# Patient Record
Sex: FEMALE | Race: WHITE | Employment: FULL TIME | URBAN - METROPOLITAN AREA
[De-identification: names, ages, dates, MRNs, and addresses within clinical notes are randomized per-mention and may not be internally consistent; named-entity substitution may affect disease eponyms.]

---

## 2017-06-14 DIAGNOSIS — Z13.6 CARDIOVASCULAR SCREENING; LDL GOAL LESS THAN 160: ICD-10-CM

## 2017-06-14 DIAGNOSIS — Z12.31 VISIT FOR SCREENING MAMMOGRAM: ICD-10-CM

## 2017-06-14 DIAGNOSIS — Z13.1 SCREENING FOR DIABETES MELLITUS (DM): ICD-10-CM

## 2017-06-14 DIAGNOSIS — Z13.29 SCREENING FOR THYROID DISORDER: ICD-10-CM

## 2017-06-14 DIAGNOSIS — Z00.00 ROUTINE GENERAL MEDICAL EXAMINATION AT A HEALTH CARE FACILITY: Primary | ICD-10-CM

## 2017-06-14 DIAGNOSIS — Z13.0 SCREENING FOR DEFICIENCY ANEMIA: ICD-10-CM

## 2017-06-28 ENCOUNTER — RADIANT APPOINTMENT (OUTPATIENT)
Dept: MAMMOGRAPHY | Facility: CLINIC | Age: 52
End: 2017-06-28
Payer: COMMERCIAL

## 2017-06-28 ENCOUNTER — OFFICE VISIT (OUTPATIENT)
Dept: PEDIATRICS | Facility: CLINIC | Age: 52
End: 2017-06-28
Payer: COMMERCIAL

## 2017-06-28 VITALS
BODY MASS INDEX: 28.01 KG/M2 | DIASTOLIC BLOOD PRESSURE: 78 MMHG | WEIGHT: 168.1 LBS | OXYGEN SATURATION: 100 % | TEMPERATURE: 97.5 F | HEART RATE: 75 BPM | HEIGHT: 65 IN | SYSTOLIC BLOOD PRESSURE: 126 MMHG

## 2017-06-28 DIAGNOSIS — Z13.29 SCREENING FOR THYROID DISORDER: ICD-10-CM

## 2017-06-28 DIAGNOSIS — R87.610 ASCUS WITH POSITIVE HIGH RISK HPV CERVICAL: ICD-10-CM

## 2017-06-28 DIAGNOSIS — Z00.00 ROUTINE GENERAL MEDICAL EXAMINATION AT A HEALTH CARE FACILITY: ICD-10-CM

## 2017-06-28 DIAGNOSIS — Z13.0 SCREENING FOR DEFICIENCY ANEMIA: ICD-10-CM

## 2017-06-28 DIAGNOSIS — Z80.3 FAMILY HISTORY OF BREAST CANCER: ICD-10-CM

## 2017-06-28 DIAGNOSIS — Z12.11 SCREENING FOR COLON CANCER: ICD-10-CM

## 2017-06-28 DIAGNOSIS — R87.810 ASCUS WITH POSITIVE HIGH RISK HPV CERVICAL: ICD-10-CM

## 2017-06-28 DIAGNOSIS — Z13.6 CARDIOVASCULAR SCREENING; LDL GOAL LESS THAN 160: ICD-10-CM

## 2017-06-28 DIAGNOSIS — Z12.4 SCREENING FOR CERVICAL CANCER: ICD-10-CM

## 2017-06-28 DIAGNOSIS — Z13.1 SCREENING FOR DIABETES MELLITUS (DM): ICD-10-CM

## 2017-06-28 DIAGNOSIS — Z00.00 ROUTINE GENERAL MEDICAL EXAMINATION AT A HEALTH CARE FACILITY: Primary | ICD-10-CM

## 2017-06-28 DIAGNOSIS — R87.610 PAPANICOLAOU SMEAR OF CERVIX WITH ATYPICAL SQUAMOUS CELLS OF UNDETERMINED SIGNIFICANCE (ASC-US): ICD-10-CM

## 2017-06-28 DIAGNOSIS — Z12.31 VISIT FOR SCREENING MAMMOGRAM: ICD-10-CM

## 2017-06-28 LAB
ALBUMIN SERPL-MCNC: 3.8 G/DL (ref 3.4–5)
ALP SERPL-CCNC: 60 U/L (ref 40–150)
ALT SERPL W P-5'-P-CCNC: 27 U/L (ref 0–50)
ANION GAP SERPL CALCULATED.3IONS-SCNC: 6 MMOL/L (ref 3–14)
AST SERPL W P-5'-P-CCNC: 22 U/L (ref 0–45)
BASOPHILS # BLD AUTO: 0 10E9/L (ref 0–0.2)
BASOPHILS NFR BLD AUTO: 0.4 %
BILIRUB SERPL-MCNC: 0.7 MG/DL (ref 0.2–1.3)
BUN SERPL-MCNC: 8 MG/DL (ref 7–30)
CALCIUM SERPL-MCNC: 8.9 MG/DL (ref 8.5–10.1)
CHLORIDE SERPL-SCNC: 104 MMOL/L (ref 94–109)
CHOLEST SERPL-MCNC: 161 MG/DL
CO2 SERPL-SCNC: 28 MMOL/L (ref 20–32)
CREAT SERPL-MCNC: 0.79 MG/DL (ref 0.52–1.04)
DIFFERENTIAL METHOD BLD: NORMAL
EOSINOPHIL # BLD AUTO: 0.1 10E9/L (ref 0–0.7)
EOSINOPHIL NFR BLD AUTO: 0.7 %
ERYTHROCYTE [DISTWIDTH] IN BLOOD BY AUTOMATED COUNT: 13.1 % (ref 10–15)
GFR SERPL CREATININE-BSD FRML MDRD: 77 ML/MIN/1.7M2
GLUCOSE SERPL-MCNC: 95 MG/DL (ref 70–99)
HCT VFR BLD AUTO: 39 % (ref 35–47)
HDLC SERPL-MCNC: 76 MG/DL
HGB BLD-MCNC: 13.2 G/DL (ref 11.7–15.7)
LDLC SERPL CALC-MCNC: 70 MG/DL
LYMPHOCYTES # BLD AUTO: 1.2 10E9/L (ref 0.8–5.3)
LYMPHOCYTES NFR BLD AUTO: 17.4 %
MCH RBC QN AUTO: 31.3 PG (ref 26.5–33)
MCHC RBC AUTO-ENTMCNC: 33.8 G/DL (ref 31.5–36.5)
MCV RBC AUTO: 92 FL (ref 78–100)
MONOCYTES # BLD AUTO: 0.5 10E9/L (ref 0–1.3)
MONOCYTES NFR BLD AUTO: 7.3 %
NEUTROPHILS # BLD AUTO: 5.2 10E9/L (ref 1.6–8.3)
NEUTROPHILS NFR BLD AUTO: 74.2 %
NONHDLC SERPL-MCNC: 85 MG/DL
PLATELET # BLD AUTO: 235 10E9/L (ref 150–450)
POTASSIUM SERPL-SCNC: 3.7 MMOL/L (ref 3.4–5.3)
PROT SERPL-MCNC: 7.4 G/DL (ref 6.8–8.8)
RBC # BLD AUTO: 4.22 10E12/L (ref 3.8–5.2)
SODIUM SERPL-SCNC: 138 MMOL/L (ref 133–144)
TRIGL SERPL-MCNC: 74 MG/DL
TSH SERPL DL<=0.005 MIU/L-ACNC: 1.8 MU/L (ref 0.4–4)
WBC # BLD AUTO: 7 10E9/L (ref 4–11)

## 2017-06-28 PROCEDURE — G0202 SCR MAMMO BI INCL CAD: HCPCS | Performed by: STUDENT IN AN ORGANIZED HEALTH CARE EDUCATION/TRAINING PROGRAM

## 2017-06-28 PROCEDURE — 99396 PREV VISIT EST AGE 40-64: CPT | Performed by: FAMILY MEDICINE

## 2017-06-28 PROCEDURE — 80050 GENERAL HEALTH PANEL: CPT | Performed by: FAMILY MEDICINE

## 2017-06-28 PROCEDURE — 36415 COLL VENOUS BLD VENIPUNCTURE: CPT | Performed by: FAMILY MEDICINE

## 2017-06-28 PROCEDURE — 80061 LIPID PANEL: CPT | Performed by: FAMILY MEDICINE

## 2017-06-28 PROCEDURE — 88175 CYTOPATH C/V AUTO FLUID REDO: CPT | Performed by: FAMILY MEDICINE

## 2017-06-28 PROCEDURE — 87624 HPV HI-RISK TYP POOLED RSLT: CPT | Performed by: FAMILY MEDICINE

## 2017-06-28 ASSESSMENT — PAIN SCALES - GENERAL: PAINLEVEL: NO PAIN (0)

## 2017-06-28 NOTE — LETTER
July 12, 2017    Sheri Montoya  [address] [facility] [address]    Dear Sheri,  We are happy to inform you that your PAP smear result from 6/28/17 is normal.  We are now able to do a follow up test on PAP smears. The DNA test is for HPV (Human Papilloma Virus). Cervical cancer is closely linked with certain types of HPV. Your result showed no evidence of high risk HPV.  Therefore we recommend you return in 3 years for your next pap smear and HPV test.  You will still need to return to the clinic every year for an annual exam and other preventive tests.  Please contact the clinic at [phone] with any questions.  Sincerely,    Ary Saldivar MD/irma

## 2017-06-28 NOTE — MR AVS SNAPSHOT
After Visit Summary   6/28/2017    Sheri Montoya    MRN: [id number]           Patient Information     Date Of Birth          1965        Visit Information        Provider Department      6/28/2017 10:40 AM Ary Saldivar MD [facility]        Today's Diagnoses     Routine general medical examination at a health care facility    -  1    Screening for cervical cancer        Papanicolaou smear of cervix with atypical squamous cells of undetermined significance (ASC-US)        Screening for colon cancer        Family history of breast cancer        CARDIOVASCULAR SCREENING; LDL GOAL LESS THAN 160          Care Instructions    Call [phone] to schedule for colonoscopy    Preventive Health Recommendations  Female Ages 50 - 64    Yearly exam: See your health care provider every year in order to  o Review health changes.   o Discuss preventive care.    o Review your medicines if your doctor has prescribed any.      Get a Pap test every three years (unless you have an abnormal result and your provider advises testing more often).    If you get Pap tests with HPV test, you only need to test every 5 years, unless you have an abnormal result.     You do not need a Pap test if your uterus was removed (hysterectomy) and you have not had cancer.    You should be tested each year for STDs (sexually transmitted diseases) if you're at risk.     Have a mammogram every 1 to 2 years.    Have a colonoscopy at age 50, or have a yearly FIT test (stool test). These exams screen for colon cancer.      Have a cholesterol test every 5 years, or more often if advised.    Have a diabetes test (fasting glucose) every three years. If you are at risk for diabetes, you should have this test more often.     If you are at risk for osteoporosis (brittle bone disease), think about having a bone density scan (DEXA).    Shots: Get a flu shot each year. Get a tetanus shot every 10 years.    Nutrition:     Eat at  least 5 servings of fruits and vegetables each day.    Eat whole-grain bread, whole-wheat pasta and brown rice instead of white grains and rice.    Talk to your provider about Calcium and Vitamin D.     Lifestyle    Exercise at least 150 minutes a week (30 minutes a day, 5 days a week). This will help you control your weight and prevent disease.    Limit alcohol to one drink per day.    No smoking.     Wear sunscreen to prevent skin cancer.     See your dentist every six months for an exam and cleaning.    See your eye doctor every 1 to 2 years.            Follow-ups after your visit        Additional Services     GASTROENTEROLOGY ADULT REF PROCEDURE ONLY       Last Lab Result: Creatinine (mg/dL)       Date                     Value                 06/28/2017               0.79             ----------  Body mass index is 28.19 kg/(m^2).     Needed:  No  Language:  English    Patient will be contacted to schedule procedure.     Please be aware that coverage of these services is subject to the terms and limitations of your health insurance plan.  Call member services at your health plan with any benefit or coverage questions.  Any procedures must be performed at a [place] facility OR coordinated by your clinic's referral office.    Please bring the following with you to your appointment:    (1) Any X-Rays, CTs or MRIs which have been performed.  Contact the facility where they were done to arrange for  prior to your scheduled appointment.    (2) List of current medications   (3) This referral request   (4) Any documents/labs given to you for this referral                  Who to contact     If you have questions or need follow up information about today's clinic visit or your schedule please contact [facility] directly at [phone].  Normal or non-critical lab and imaging results will be communicated to you by MyChart, letter or phone within 4 business days after the clinic has  "received the results. If you do not hear from us within 7 days, please contact the clinic through [employer] or phone. If you have a critical or abnormal lab result, we will notify you by phone as soon as possible.  Submit refill requests through [employer] or call your pharmacy and they will forward the refill request to us. Please allow 3 business days for your refill to be completed.          Additional Information About Your Visit        [employer] Information     [employer] is an electronic gateway that provides easy, online access to your medical records. With [employer], you can request a clinic appointment, read your test results, renew a prescription or communicate with your care team.     To sign up for [employer] visit the website at [email or web address]   You will be asked to enter the access code listed below, as well as some personal information. Please follow the directions to create your username and password.     Your access code is: [email or web address]  Expires: 2017 10:57 AM     Your access code will  in 90 days. If you need help or a new code, please contact your [facility] or call [phone] for assistance.        Care EveryWhere ID     This is your Care EveryWhere ID. This could be used by other organizations to access your [facility] medical records  [id number]        Your Vitals Were     Pulse Temperature Height Last Period Pulse Oximetry BMI (Body Mass Index)    75 97.5  F (36.4  C) (Oral) 5' 4.75\" (1.645 m) 2017 (Exact Date) 100% 28.19 kg/m2       Blood Pressure from Last 3 Encounters:   17 126/78   06/10/16 128/86   04/07/15 (!) 146/95    Weight from Last 3 Encounters:   17 168 lb 1.6 oz (76.2 kg)   06/10/16 175 lb 3.2 oz (79.5 kg)   04/07/15 182 lb (82.6 kg)              We Performed the Following     GASTROENTEROLOGY ADULT REF PROCEDURE ONLY     HPV High Risk Types DNA Cervical     Pap imaged thin layer diagnostic with HPV (select HPV order " below)        Primary Care Provider Office Phone # Fax #    Ary Saldivar [phone]       [facility] [id number]  [facility] [address]        Equal Access to Services     STACIE BURTON : Hadii ryan ku hadcelio Soomaali, waaxda luqadaha, qaybta kaalmada adeegyada, marcus cevallosn jenniferaaliyah person ena keith. So [facility] [phone].    ATENCIÓN: Si habla español, tiene a martinez disposición servicios gratuitos de asistencia lingüística. Llame al [phone].    We comply with applicable federal civil rights laws and [place] laws. We do not discriminate on the basis of race, color, national origin, age, disability sex, sexual orientation or gender identity.            Thank you!     Thank you for choosing [facility]  for your care. Our goal is always to provide you with excellent care. Hearing back from our patients is one way we can continue to improve our services. Please take a few minutes to complete the written survey that you may receive in the mail after your visit with us. Thank you!             Your Updated Medication List - Protect others around you: Learn how to safely use, store and throw away your medicines at www.disposemymeds.org.          This list is accurate as of: 6/28/17 10:57 AM.  Always use your most recent med list.                   Brand Name Dispense Instructions for use Diagnosis    DAILY MULTIVITAMIN PO      Take 1 tablet by mouth daily    Routine general medical examination at a health care facility, Screening for cervical cancer, Papanicolaou smear of cervix with atypical squamous cells of undetermined significance (ASC-US)

## 2017-06-28 NOTE — LETTER
June 28, 2017      Sheri Montoya  [address] [facility] [address]              Dear Sheri,    Your recent lab results are normal.       Sincerely,  Dr. Yariel MD    Resulted Orders   CBC with platelets differential   Result Value Ref Range    WBC 7.0 4.0 - 11.0 10e9/L    RBC Count 4.22 3.8 - 5.2 10e12/L    Hemoglobin 13.2 11.7 - 15.7 g/dL    Hematocrit 39.0 35.0 - 47.0 %    MCV 92 78 - 100 fl    MCH 31.3 26.5 - 33.0 pg    MCHC 33.8 31.5 - 36.5 g/dL    RDW 13.1 10.0 - 15.0 %    Platelet Count 235 150 - 450 10e9/L    Diff Method Automated Method     % Neutrophils 74.2 %    % Lymphocytes 17.4 %    % Monocytes 7.3 %    % Eosinophils 0.7 %    % Basophils 0.4 %    Absolute Neutrophil 5.2 1.6 - 8.3 10e9/L    Absolute Lymphocytes 1.2 0.8 - 5.3 10e9/L    Absolute Monocytes 0.5 0.0 - 1.3 10e9/L    Absolute Eosinophils 0.1 0.0 - 0.7 10e9/L    Absolute Basophils 0.0 0.0 - 0.2 10e9/L   **Comprehensive metabolic panel FUTURE anytime   Result Value Ref Range    Sodium 138 133 - 144 mmol/L    Potassium 3.7 3.4 - 5.3 mmol/L    Chloride 104 94 - 109 mmol/L    Carbon Dioxide 28 20 - 32 mmol/L    Anion Gap 6 3 - 14 mmol/L    Glucose 95 70 - 99 mg/dL      Comment:      Fasting specimen    Urea Nitrogen 8 7 - 30 mg/dL    Creatinine 0.79 0.52 - 1.04 mg/dL    GFR Estimate 77 >60 mL/min/1.7m2      Comment:      Non  GFR Calc    GFR Estimate If Black >90   GFR Calc   >60 mL/min/1.7m2    Calcium 8.9 8.5 - 10.1 mg/dL    Bilirubin Total 0.7 0.2 - 1.3 mg/dL    Albumin 3.8 3.4 - 5.0 g/dL    Protein Total 7.4 6.8 - 8.8 g/dL    Alkaline Phosphatase 60 40 - 150 U/L    ALT 27 0 - 50 U/L    AST 22 0 - 45 U/L   **Lipid panel reflex to direct LDL FUTURE anytime   Result Value Ref Range    Cholesterol 161 <200 mg/dL    Triglycerides 74 <150 mg/dL      Comment:      Fasting specimen    HDL Cholesterol 76 >49 mg/dL    LDL Cholesterol Calculated 70 <100 mg/dL      Comment:      Desirable:       <100 mg/dl    Non  HDL Cholesterol 85 <130 mg/dL   TSH with free T4 reflex   Result Value Ref Range    TSH 1.80 0.40 - 4.00 mU/L

## 2017-06-28 NOTE — PROGRESS NOTES
SUBJECTIVE:   CC: Sheri Montoya is an 52 year old woman who presents for preventive health visit.     Healthy Habits:    Do you get at least three servings of calcium containing foods daily (dairy, green leafy vegetables, etc.)? no, taking calcium and/or vitamin D supplement: no    Amount of exercise or daily activities, outside of work: 5 day(s) per week    Problems taking medications regularly not applicable    Medication side effects: No    Have you had an eye exam in the past two years? yes    Do you see a dentist twice per year? yes    Do you have sleep apnea, excessive snoring or daytime drowsiness?no            Today's PHQ-2 Score:   PHQ-2 ( 1999 Pf[employer]) 6/28/2017 6/10/2016   Q1: Little interest or pleasure in doing things 0 0   Q2: Feeling down, depressed or hopeless 0 0   PHQ-2 Score 0 0       Abuse: Current or Past(Physical, Sexual or Emotional)- No  Do you feel safe in your environment - Yes    Social History   Substance Use Topics     Smoking status: Never Smoker     Smokeless tobacco: Not on file     Alcohol use 3.5 oz/week     7 drink(s) per week     The patient does not drink >3 drinks per day nor >7 drinks per week.    Reviewed orders with patient.  Reviewed health maintenance and updated orders accordingly - Yes  Labs reviewed in EPIC  BP Readings from Last 3 Encounters:   06/28/17 126/78   06/10/16 128/86   04/07/15 (!) 146/95    Wt Readings from Last 3 Encounters:   06/28/17 168 lb 1.6 oz (76.2 kg)   06/10/16 175 lb 3.2 oz (79.5 kg)   04/07/15 182 lb (82.6 kg)                  Patient Active Problem List   Diagnosis     CARDIOVASCULAR SCREENING; LDL GOAL LESS THAN 160     Family history of breast cancer     Overweight (BMI 25.0-29.9)     ASCUS on Pap smear     Past Surgical History:   Procedure Laterality Date     CYSTECTOMY PILONIDAL  1996     HC TOOTH EXTRACTION W/FORCEP  2002?     SURGICAL HISTORY OF -       infertility workup-hysteroscope        Social History   Substance Use Topics      Smoking status: Never Smoker     Smokeless tobacco: Not on file     Alcohol use 3.5 oz/week     7 drink(s) per week     Family History   Problem Relation Age of Onset     Hypertension Father      Lipids Father      DIABETES Maternal Grandfather      Asthma No family hx of      Cancer - colorectal No family hx of      Prostate Cancer No family hx of      CEREBROVASCULAR DISEASE Maternal Grandmother      Breast Cancer Mother      CANCER Mother 73     tumors      C.A.D. Paternal Uncle          Current Outpatient Prescriptions   Medication Sig Dispense Refill     Multiple Vitamins-Minerals (DAILY MULTIVITAMIN PO) Take 1 tablet by mouth daily       No Known Allergies  Recent Labs   Lab Test  06/28/17   1023  06/10/16   0935  04/07/15   1114  07/23/14   1005   05/21/09   0835   LDL  70  82   --   94   < >  92   HDL  76  53   --   58   < >  59   TRIG  74  103   --   116   < >  88   ALT  27   --   34   --    --   17   CR  0.79   --   0.79   --    --   0.75   GFRESTIMATED  77   --   77   --    --   85   GFRESTBLACK  >90   GFR Calc     --   >90   GFR Calc     --    --   >90   POTASSIUM  3.7   --   3.8   --    --   4.6   TSH  1.80   --   1.97   --    < >  1.98    < > = values in this interval not displayed.        Patient over age 50, mutual decision to screen reflected in health maintenance.    Pertinent mammograms are reviewed under the imaging tab.  History of abnormal Pap smear: YES - updated in Problem List and Health Maintenance accordingly    Reviewed and updated as needed this visit by clinical staff  Tobacco  Allergies  Meds  Med Hx  Surg Hx  Fam Hx  Soc Hx        Reviewed and updated as needed this visit by Provider          Past Medical History:   Diagnosis Date     ASCUS with positive high risk HPV 7/23/14    + HR HPV 52     Breast cyst      Chicken pox      H/O colposcopy with cervical biopsy 9/2014    Neg for dysplasia     Infertility     s/p 3 rounds of IVF. Was pregnant  "only very briefly     Obesity      Positive TB test     Has been on INH ura2012      Past Surgical History:   Procedure Laterality Date     CYSTECTOMY PILONIDAL       HC TOOTH EXTRACTION W/FORCEP  ?     SURGICAL HISTORY OF -       infertility workup-hysteroscope      Obstetric History       T0      L0     SAB0   TAB0   Ectopic0   Multiple0   Live Births0           ROS:  C: NEGATIVE for fever, chills, change in weight  I: NEGATIVE for worrisome rashes, moles or lesions  E: NEGATIVE for vision changes or irritation  ENT: NEGATIVE for ear, mouth and throat problems  R: NEGATIVE for significant cough or SOB  B: NEGATIVE for masses, tenderness or discharge  CV: NEGATIVE for chest pain, palpitations or peripheral edema  GI: NEGATIVE for nausea, abdominal pain, heartburn, or change in bowel habits  : NEGATIVE for unusual urinary or vaginal symptoms. No vaginal bleeding.  M: NEGATIVE for significant arthralgias or myalgia  N: NEGATIVE for weakness, dizziness or paresthesias  E: NEGATIVE for temperature intolerance, skin/hair changes  H: NEGATIVE for bleeding problems  P: NEGATIVE for changes in mood or affect     OBJECTIVE:   /78  Pulse 75  Temp 97.5  F (36.4  C) (Oral)  Ht 5' 4.75\" (1.645 m)  Wt 168 lb 1.6 oz (76.2 kg)  LMP 2017 (Exact Date)  SpO2 100%  BMI 28.19 kg/m2  EXAM:  GENERAL: healthy, alert and no distress  EYES: Eyes grossly normal to inspection, PERRL and conjunctivae and sclerae normal  HENT: ear canals and TM's normal, nose and mouth without ulcers or lesions  NECK: no adenopathy, no asymmetry, masses, or scars and thyroid normal to palpation  RESP: lungs clear to auscultation - no rales, rhonchi or wheezes  BREAST: normal without masses, tenderness or nipple discharge and no palpable axillary masses or adenopathy  CV: regular rate and rhythm, normal S1 S2, no S3 or S4, no murmur, click or rub, no peripheral edema and peripheral pulses strong  ABDOMEN: soft, " nontender, no hepatosplenomegaly, no masses and bowel sounds normal   (female): normal female external genitalia, normal urethral meatus, vaginal mucosa pink, moist, well rugated, and normal cervix/adnexa/uterus without masses or discharge  MS: no gross musculoskeletal defects noted, no edema  SKIN: no suspicious lesions or rashes  NEURO: Normal strength and tone, mentation intact and speech normal  PSYCH: mentation appears normal, affect normal/bright    ASSESSMENT/PLAN:   1. Routine general medical examination at a health care facility  Discussed on regular exercises, daily calcium intake, healthy eating, self breast exams monthly and routine dental checks    - Pap imaged thin layer diagnostic with HPV (select HPV order below)  - HPV High Risk Types DNA Cervical  - GASTROENTEROLOGY ADULT REF PROCEDURE ONLY    2. Screening for cervical cancer      - Pap imaged thin layer diagnostic with HPV (select HPV order below)  - HPV High Risk Types DNA Cervical    3. Papanicolaou smear of cervix with atypical squamous cells of undetermined significance (ASC-US)    -- Pap imaged thin layer diagnostic with HPV (select HPV order below)  - HPV High Risk Types DNA Cervical    4. Screening for colon cancer    - GASTROENTEROLOGY ADULT REF PROCEDURE ONLY    5. Family history of breast cancer  Continue annual mammograms    6. CARDIOVASCULAR SCREENING; LDL GOAL LESS THAN 160  LDL Cholesterol Calculated   Date Value Ref Range Status   06/28/2017 70 <100 mg/dL Final     Comment:     Desirable:       <100 mg/dl   ]        COUNSELING:   Reviewed preventive health counseling, as reflected in patient instructions  Special attention given to:        Regular exercise       Healthy diet/nutrition       Vision screening       Osteoporosis Prevention/Bone Health       Colon cancer screening       ([place])menopause management       The 10-year ASCVD risk score (Derek STRICKLAND Jr, et al., 2013) is: 0.7%    Values used to calculate the score:      Age: 52  "years      Sex: Female      Is Non- : No      Diabetic: No      Tobacco smoker: No      Systolic Blood Pressure: 126 mmHg      Is BP treated: No      HDL Cholesterol: 76 mg/dL      Total Cholesterol: 161 mg/dL       Advance Care Planning         reports that she has never smoked. She does not have any smokeless tobacco history on file.    Estimated body mass index is 28.19 kg/(m^2) as calculated from the following:    Height as of this encounter: 5' 4.75\" (1.645 m).    Weight as of this encounter: 168 lb 1.6 oz (76.2 kg).   Weight management plan: Discussed healthy diet and exercise guidelines and patient will follow up in 12 months in clinic to re-evaluate.    Counseling Resources:  ATP IV Guidelines  Pooled Cohorts Equation Calculator  Breast Cancer Risk Calculator  FRAX Risk Assessment  ICSI Preventive Guidelines  Dietary Guidelines for Americans, 2010  USDA's MyPlate  ASA Prophylaxis  Lung CA Screening    Ary Saldivar MD  [facility]  Chart documentation done in part with Dragon Voice recognition Software. Although reviewed after completion, some word and grammatical error may remain.    "

## 2017-06-28 NOTE — PATIENT INSTRUCTIONS
Call [phone] to schedule for colonoscopy    Preventive Health Recommendations  Female Ages 50 - 64    Yearly exam: See your health care provider every year in order to  o Review health changes.   o Discuss preventive care.    o Review your medicines if your doctor has prescribed any.      Get a Pap test every three years (unless you have an abnormal result and your provider advises testing more often).    If you get Pap tests with HPV test, you only need to test every 5 years, unless you have an abnormal result.     You do not need a Pap test if your uterus was removed (hysterectomy) and you have not had cancer.    You should be tested each year for STDs (sexually transmitted diseases) if you're at risk.     Have a mammogram every 1 to 2 years.    Have a colonoscopy at age 50, or have a yearly FIT test (stool test). These exams screen for colon cancer.      Have a cholesterol test every 5 years, or more often if advised.    Have a diabetes test (fasting glucose) every three years. If you are at risk for diabetes, you should have this test more often.     If you are at risk for osteoporosis (brittle bone disease), think about having a bone density scan (DEXA).    Shots: Get a flu shot each year. Get a tetanus shot every 10 years.    Nutrition:     Eat at least 5 servings of fruits and vegetables each day.    Eat whole-grain bread, whole-wheat pasta and brown rice instead of white grains and rice.    Talk to your provider about Calcium and Vitamin D.     Lifestyle    Exercise at least 150 minutes a week (30 minutes a day, 5 days a week). This will help you control your weight and prevent disease.    Limit alcohol to one drink per day.    No smoking.     Wear sunscreen to prevent skin cancer.     See your dentist every six months for an exam and cleaning.    See your eye doctor every 1 to 2 years.

## 2017-06-28 NOTE — NURSING NOTE
"Chief Complaint   Patient presents with     Physical       Initial /78  Pulse 75  Temp 97.5  F (36.4  C) (Oral)  Ht 5' 4.75\" (1.645 m)  Wt 168 lb 1.6 oz (76.2 kg)  LMP 06/17/2017 (Exact Date)  SpO2 100%  BMI 28.19 kg/m2 Estimated body mass index is 28.19 kg/(m^2) as calculated from the following:    Height as of this encounter: 5' 4.75\" (1.645 m).    Weight as of this encounter: 168 lb 1.6 oz (76.2 kg).  BP completed using cuff size: shaquille Garza CMA    "

## 2017-07-03 LAB
COPATH REPORT: NORMAL
PAP: NORMAL

## 2017-07-05 LAB
FINAL DIAGNOSIS: NORMAL
HPV HR 12 DNA CVX QL NAA+PROBE: NEGATIVE
HPV16 DNA SPEC QL NAA+PROBE: NEGATIVE
HPV18 DNA SPEC QL NAA+PROBE: NEGATIVE
SPECIMEN DESCRIPTION: NORMAL

## 2017-09-18 ENCOUNTER — TRANSFERRED RECORDS (OUTPATIENT)
Dept: HEALTH INFORMATION MANAGEMENT | Facility: CLINIC | Age: 52
End: 2017-09-18

## 2017-09-18 ENCOUNTER — HOSPITAL ENCOUNTER (OUTPATIENT)
Facility: AMBULATORY SURGERY CENTER | Age: 52
Discharge: HOME | End: 2017-09-18
Attending: SPECIALIST | Admitting: SPECIALIST
Payer: COMMERCIAL

## 2017-09-18 ENCOUNTER — SURGERY (OUTPATIENT)
Age: 52
End: 2017-09-18

## 2017-09-18 VITALS
OXYGEN SATURATION: 99 % | DIASTOLIC BLOOD PRESSURE: 79 MMHG | SYSTOLIC BLOOD PRESSURE: 125 MMHG | RESPIRATION RATE: 18 BRPM | TEMPERATURE: 98.3 F

## 2017-09-18 LAB — COLONOSCOPY: NORMAL

## 2017-09-18 PROCEDURE — 88305 TISSUE EXAM BY PATHOLOGIST: CPT | Performed by: SPECIALIST

## 2017-09-18 PROCEDURE — 45380 COLONOSCOPY AND BIOPSY: CPT | Mod: XS

## 2017-09-18 PROCEDURE — G8907 PT DOC NO EVENTS ON DISCHARG: HCPCS

## 2017-09-18 PROCEDURE — G8918 PT W/O PREOP ORDER IV AB PRO: HCPCS

## 2017-09-18 PROCEDURE — 45385 COLONOSCOPY W/LESION REMOVAL: CPT

## 2017-09-18 RX ORDER — FENTANYL CITRATE 50 UG/ML
INJECTION, SOLUTION INTRAMUSCULAR; INTRAVENOUS PRN
Status: DISCONTINUED | OUTPATIENT
Start: 2017-09-18 | End: 2017-09-18 | Stop reason: HOSPADM

## 2017-09-18 RX ORDER — LIDOCAINE 40 MG/G
CREAM TOPICAL
Status: DISCONTINUED | OUTPATIENT
Start: 2017-09-18 | End: 2017-09-19 | Stop reason: HOSPADM

## 2017-09-18 RX ORDER — NALOXONE HYDROCHLORIDE 0.4 MG/ML
.1-.4 INJECTION, SOLUTION INTRAMUSCULAR; INTRAVENOUS; SUBCUTANEOUS
Status: CANCELLED | OUTPATIENT
Start: 2017-09-18 | End: 2017-09-19

## 2017-09-18 RX ORDER — FLUMAZENIL 0.1 MG/ML
0.2 INJECTION, SOLUTION INTRAVENOUS
Status: CANCELLED | OUTPATIENT
Start: 2017-09-18 | End: 2017-09-19

## 2017-09-18 RX ORDER — ONDANSETRON 4 MG/1
4 TABLET, ORALLY DISINTEGRATING ORAL EVERY 6 HOURS PRN
Status: CANCELLED | OUTPATIENT
Start: 2017-09-18

## 2017-09-18 RX ORDER — ONDANSETRON 2 MG/ML
4 INJECTION INTRAMUSCULAR; INTRAVENOUS EVERY 6 HOURS PRN
Status: CANCELLED | OUTPATIENT
Start: 2017-09-18

## 2017-09-18 RX ORDER — ONDANSETRON 2 MG/ML
4 INJECTION INTRAMUSCULAR; INTRAVENOUS
Status: DISCONTINUED | OUTPATIENT
Start: 2017-09-18 | End: 2017-09-19 | Stop reason: HOSPADM

## 2017-09-18 RX ADMIN — FENTANYL CITRATE 50 MCG: 50 INJECTION, SOLUTION INTRAMUSCULAR; INTRAVENOUS at 12:21

## 2017-09-18 RX ADMIN — FENTANYL CITRATE 50 MCG: 50 INJECTION, SOLUTION INTRAMUSCULAR; INTRAVENOUS at 12:18

## 2017-09-18 NOTE — BRIEF OP NOTE
[facility] Brief Operative Note    Pre-operative diagnosis: Screening for colon cancer  Routine general medical examination at a health care facility  bmi 28.19  Pharmacy: [facility] [place]   Post-operative diagnosis rectal polyp     Procedure: Procedure(s):  Screening for colon cancer  Routine general medical examination at a health care facility  bmi 28.19  Pharmacy: [facility] [place]   - Wound Class: II-Clean Contaminated   - Wound Class: II-Clean Contaminated   - Wound Class: II-Clean Contaminated   Surgeon(s): Surgeon(s) and Role:     * Milan Cameron MD - Primary   Estimated blood loss: * No values recorded between 9/18/2017 12:16 PM and 9/18/2017 12:48 PM *    Specimens:   ID Type Source Tests Collected by Time Destination   A : ileo-cecal vavle polyp x1/cold forcep Polyp Colon SURGICAL PATHOLOGY EXAM Milan Cameron MD 9/18/2017 12:34 PM    B : rectal polyp x1/hot snared Polyp Colon SURGICAL PATHOLOGY EXAM Milan Cameron MD 9/18/2017 12:41 PM       Findings: Please see ProVation procedure note in Chart Review

## 2017-09-18 NOTE — H&P
Pre-Endoscopy History and Physical     Sheri Montoya MRN# [id number]   YOB: 1965 Age: 52 year old     Date of Procedure: 9/18/2017  Primary care provider: Ary Saldivar  Type of Endoscopy: Colonoscopy with possible biopsy, possible polypectomy  Reason for Procedure: screening  Type of Anesthesia Anticipated: Conscious Sedation    HPI:    Sheri is a 52 year old female who will be undergoing the above procedure.      A history and physical has been performed. The patient's medications and allergies have been reviewed. The risks and benefits of the procedure and the sedation options and risks were discussed with the patient.  All questions were answered and informed consent was obtained.      She denies a personal or family history of anesthesia complications or bleeding disorders.     Patient Active Problem List   Diagnosis     CARDIOVASCULAR SCREENING; LDL GOAL LESS THAN 160     Family history of breast cancer     Overweight (BMI 25.0-29.9)     ASCUS with positive high risk HPV cervical        Past Medical History:   Diagnosis Date     ASCUS with positive high risk HPV 7/23/14    + HR HPV 52     Breast cyst      Chicken pox      H/O colposcopy with cervical biopsy 9/2014    Neg for dysplasia     Infertility     s/p 3 rounds of IVF. Was pregnant only very briefly     Obesity      Positive TB test     [facility]        Past Surgical History:   Procedure Laterality Date     CYSTECTOMY PILONIDAL  1996     HC TOOTH EXTRACTION W/FORCEP  2002?     SURGICAL HISTORY OF -       infertility workup-hysteroscope        Social History   Substance Use Topics     Smoking status: Never Smoker     Smokeless tobacco: Never Used     Alcohol use 3.5 oz/week     7 Standard drinks or equivalent per week       Family History   Problem Relation Age of Onset     Hypertension Father      Lipids Father      DIABETES Maternal Grandfather      CEREBROVASCULAR DISEASE Maternal Grandmother      Breast Cancer  "Mother      CANCER Mother 73     tumors      C.A.D. Paternal Uncle      Asthma No family hx of      Cancer - colorectal No family hx of      Prostate Cancer No family hx of        Prior to Admission medications    Medication Sig Start Date End Date Taking? Authorizing Provider   Multiple Vitamins-Minerals (DAILY MULTIVITAMIN PO) Take 1 tablet by mouth daily 6/28/17  Yes Reported, Patient       No Known Allergies     REVIEW OF SYSTEMS:   5 point ROS negative except as noted above in HPI, including Gen., Resp., CV, GI &  system review.    PHYSICAL EXAM:   BP (!) 143/103  Temp 98.3  F (36.8  C) (Temporal)  LMP 08/22/2017 (Approximate)  SpO2 100% Estimated body mass index is 28.19 kg/(m^2) as calculated from the following:    Height as of 6/28/17: 1.645 m (5' 4.75\").    Weight as of 6/28/17: 76.2 kg (168 lb 1.6 oz).   GENERAL APPEARANCE: alert, and oriented  MENTAL STATUS: alert  AIRWAY EXAM: Mallampatti Class I (visualization of the soft palate, fauces, uvula, anterior and posterior pillars)  RESP: lungs clear to auscultation - no rales, rhonchi or wheezes  CV: regular rates and rhythm  DIAGNOSTICS:    Not indicated    IMPRESSION   ASA Class 1 - Healthy patient, no medical problems    PLAN:   Plan for Colonoscopy with possible biopsy, possible polypectomy. We discussed the risks, benefits and alternatives and the patient wished to proceed.    The above has been forwarded to the consulting provider.      Signed Electronically by: Milan Cameron  September 18, 2017          "

## 2017-09-20 LAB — COPATH REPORT: NORMAL

## 2018-09-03 ENCOUNTER — HEALTH MAINTENANCE LETTER (OUTPATIENT)
Age: 53
End: 2018-09-03

## 2018-09-17 ENCOUNTER — TELEPHONE (OUTPATIENT)
Dept: PEDIATRICS | Facility: CLINIC | Age: 53
End: 2018-09-17

## 2018-09-17 NOTE — TELEPHONE ENCOUNTER
Last office visit:  6/28/17   No previous MRI results noted.    Patient will need office visit for orders since it has been over 1 year since seen by .    Left detailed message on patients phone informing patient that since it has been over a year since she saw Dr. Saldivar she would need to schedule an office visit for any orders.  Her last office visit was on 6/28/17.  She can call [phone] to schedule an appt with .    Tabitha Kay RN,   [facility]

## 2018-09-17 NOTE — TELEPHONE ENCOUNTER
[facility] Call Center    Phone Message    May a detailed message be left on voicemail: yes    Reason for Call: Order(s): Other:   Reason for requested: MRI order   Date needed: asap  Provider name: dr. hays      Action Taken: Message routed to:  Primary Care p [phone]

## 2018-11-06 ENCOUNTER — OFFICE VISIT (OUTPATIENT)
Dept: FAMILY MEDICINE | Facility: CLINIC | Age: 53
End: 2018-11-06
Payer: COMMERCIAL

## 2018-11-06 VITALS — DIASTOLIC BLOOD PRESSURE: 93 MMHG | TEMPERATURE: 97.9 F | SYSTOLIC BLOOD PRESSURE: 143 MMHG

## 2018-11-06 DIAGNOSIS — Z71.84 COUNSELING FOR TRAVEL: Primary | ICD-10-CM

## 2018-11-06 DIAGNOSIS — Z23 NEED FOR VACCINATION: ICD-10-CM

## 2018-11-06 PROCEDURE — 90691 TYPHOID VACCINE IM: CPT | Mod: GA | Performed by: FAMILY MEDICINE

## 2018-11-06 PROCEDURE — 90686 IIV4 VACC NO PRSV 0.5 ML IM: CPT | Performed by: FAMILY MEDICINE

## 2018-11-06 PROCEDURE — 99401 PREV MED CNSL INDIV APPRX 15: CPT | Mod: 25 | Performed by: FAMILY MEDICINE

## 2018-11-06 PROCEDURE — 90472 IMMUNIZATION ADMIN EACH ADD: CPT | Mod: GA | Performed by: FAMILY MEDICINE

## 2018-11-06 PROCEDURE — 90471 IMMUNIZATION ADMIN: CPT | Performed by: FAMILY MEDICINE

## 2018-11-06 RX ORDER — AZITHROMYCIN 500 MG/1
TABLET, FILM COATED ORAL
Qty: 3 TABLET | Refills: 0 | Status: SHIPPED | OUTPATIENT
Start: 2018-11-06 | End: 2018-11-21

## 2018-11-06 NOTE — MR AVS SNAPSHOT
After Visit Summary   11/6/2018    Sheri Montoya    MRN: [id number]           Patient Information     Date Of Birth          1965        Visit Information        Provider Department      11/6/2018 1:45 PM John Teixeira MD [facility]        Today's Diagnoses     Counseling for travel    -  1    Need for vaccination           Follow-ups after your visit        Your next 10 appointments already scheduled     Nov 21, 2018  1:50 PM CST   MA SCREENING DIGITAL BILATERAL with MGMA1, MG MA TECH   [facility] [[email or web address]] ([facility] [[email or web address]])    [address] [place] [id number] [phone]           How do I prepare for my exam? (Food and drink instructions) No Food and Drink Restrictions.  How do I prepare for my exam? (Other instructions) Do not use any powder, lotion or deodorant under your arms or on your breast. If you do, we will ask you to remove it before your exam.  What should I wear: Wear comfortable, two-piece clothing.  How long does the exam take: Most scans will take 15 minutes.  What should I bring: Bring any previous mammograms from other facilities or have them mailed to the breast center.  Do I need a :  No  is needed.  What do I need to tell my doctor: If you have any allergies, tell your care team.  What should I do after the exam: No restrictions, You may resume normal activities.  What is this test: This test is an x-ray of the breast to look for breast disease. The breast is pressed between two plates to flatten and spread the tissue. An X-ray is taken of the breast from different angles.  Who should I call with questions: If you have any questions, please call the Imaging Department where you will have your exam. Directions, parking instructions, and other information is available on our website, [email or web address].  Other information about my exam Three-dimensional (3D) mammograms are available at [place]  "locations in [facility], [facility], [place], [facility] and [place]. [facility] locations include [place] and the [facility] [place] [facility] in [place].  Benefits of 3D mammograms include * Improved rate of cancer detection * Decreases your chance of having to go back for more tests, which means fewer: * \"False-positive\" results (This means that there is an abnormal area but it isn't cancer.) * Invasive testing procedures, such as a biopsy or surgery * Can provide clearer images of the breast if you have dense breast tissue.  *3D mammography is an optional exam that anyone can have with a 2D mammogram. It doesn't replace or take the place of a 2D mammogram. 2D mammograms remain an effective screening test for all women.  Not all insurance companies cover the cost of a 3D mammogram. Check with your insurance. Three-dimensional (3D) mammograms are available at [place] locations in [facility], [facility], [facility], and [place]. Health locations include [place] and [facility] in [place]. Benefits of 3D mammograms include: - Improved rate of cancer detection - Decreases your chance of having to go back for more tests, which means fewer: - \"False-positive\" results (This means that there is an abnormal area but it isn't cancer.) - Invasive testing procedures, such as a biopsy or surgery - Can provide clearer images of the breast if you have dense breast tissue. 3D mammography is an optional exam that anyone can have with a 2D mammogram. It doesn't replace or take the place of a 2D mammogram. 2D mammograms remain an effective screening test for all women.  Not all insurance companies cover the cost of a 3D mammogram. Check with your insurance.            Nov 21, 2018  2:10 PM CST   PHYSICAL with Ary Saldivar MD   [facility] ([facility]    [address] [place] [address] " "[id number] [phone]              Who to contact     If you have questions or need follow up information about today's clinic visit or your schedule please contact [facility] directly at [phone].  Normal or non-critical lab and imaging results will be communicated to you by [employer], letter or phone within 4 business days after the clinic has received the results. If you do not hear from us within 7 days, please contact the clinic through [employer] or phone. If you have a critical or abnormal lab result, we will notify you by phone as soon as possible.  Submit refill requests through [employer] or call your pharmacy and they will forward the refill request to us. Please allow 3 business days for your refill to be completed.          Additional Information About Your Visit        [employer] Information     [employer] lets you send messages to your doctor, view your test results, renew your prescriptions, schedule appointments and more. To sign up, go to [email or web address] . Click on \"Log in\" on the left side of the screen, which will take you to the Welcome page. Then click on \"Sign up Now\" on the right side of the page.     You will be asked to enter the access code listed below, as well as some personal information. Please follow the directions to create your username and password.     Your access code is: [email or web address]  Expires: 2019  4:43 PM     Your access code will  in 90 days. If you need help or a new code, please call your [facility] clinic or [phone].        Care EveryWhere ID     This is your Care EveryWhere ID. This could be used by other organizations to access your [facility] medical records  [id number]        Your Vitals Were     Temperature                   97.9  F (36.6  C) (Oral)            Blood Pressure from Last 3 Encounters:   18 (!) 143/93   17 125/79   17 126/78    Weight from Last 3 Encounters:   17 168 lb 1.6 oz (76.2 kg)   06/10/16 175 lb " 3.2 oz (79.5 kg)   04/07/15 182 lb (82.6 kg)              We Performed the Following     ADMIN 1st VACCINE     EA ADD'L VACCINE     HC FLU VAC PRESRV FREE QUAD SPLIT VIR 3+YRS IM     TYPHOID VACCINE, IM          Today's Medication Changes          These changes are accurate as of 11/6/18  4:43 PM.  If you have any questions, ask your nurse or doctor.               Start taking these medicines.        Dose/Directions    azithromycin 500 MG tablet   Commonly known as:  ZITHROMAX   Used for:  Counseling for travel, Need for vaccination   Started by:  John Teixeira MD        Take one tablet daily for up to 3 days as needed for traveler's diarrhea   Quantity:  3 tablet   Refills:  0            Where to get your medicines      These medications were sent to [id number] [place], [place] [facility] [address] [facility] [address]  [address] [address] [address] [facility] [address]     Phone:  [phone]     azithromycin 500 MG tablet                Primary Care Provider Office Phone # Fax #    Ary Saldivar [phone] [address]  [facility] [address]        Equal Access to Services     [facility]: Hadii aad ku hadasho Soomaali, waaxda luqadaha, qaybta kaalmada adeegyada, marcus steen haykaryn jhoan sutherland . So [facility] [phone].    ATENCIÓN: Si habla español, tiene a martinez disposición servicios gratuitos de asistencia lingüística. [facility] [phone].    We comply with applicable federal civil rights laws and [place] laws. We do not discriminate on the basis of race, color, national origin, age, disability, sex, sexual orientation, or gender identity.            Thank you!     Thank you for choosing [facility]  for your care. Our goal is always to provide you with excellent care. Hearing back from our patients is one way we can continue to improve our services. Please take a few minutes to complete the written survey that you may receive in the mail after your visit  with us. Thank you!             Your Updated Medication List - Protect others around you: Learn how to safely use, store and throw away your medicines at www.disposemymeds.org.          This list is accurate as of 11/6/18  4:43 PM.  Always use your most recent med list.                   Brand Name Dispense Instructions for use Diagnosis    azithromycin 500 MG tablet    ZITHROMAX    3 tablet    Take one tablet daily for up to 3 days as needed for traveler's diarrhea    Counseling for travel, Need for vaccination       DAILY MULTIVITAMIN PO      Take 1 tablet by mouth daily

## 2018-11-06 NOTE — PROGRESS NOTES
Itinerary:  [place]    Departure Date: 12/15/18    Return Date: 1/1/19    Length of Trip: 2 weeks    Purpose of Trip: pleasure    Urban/Rural: both    Accommodations: hotels    IMMUNIZATION HISTORY  Have you received any immunizations within the past 4 weeks?  No  Have you ever fainted from having your blood drawn or from an injection?  No  Have you ever had a fever reaction to vaccination?  No  Have you ever had any bad reaction or side effect from any vaccination?  No  Have you ever had hepatitis A or B vaccine?  Yes  Do you live (or work closely) with anyone who has AIDS, an AIDS-like condition, any other immune disorder or who is on chemotherapy for cancer or a   family history of immunodeficiency?  No  Have you received any injection of immune globulin or any blood products during the past 12 months?  No    Patient roomed by Thad Vance ma      Special medical concerns: none    BP (!) 143/93 (BP Location: Right arm)  Temp 97.9  F (36.6  C) (Oral)  EXAM: deferred    Family counseling visit  For this patient reviewed current immunization records  Immunization History   Administered Date(s) Administered     HEPA 10/28/2005, 11/16/2009     HepB 12/31/1994, 06/10/2016     Influenza (IIV3) PF 10/28/2005, 10/04/2011, 11/30/2012     MMR 11/16/2009     Poliovirus, inactivated (IPV) 10/28/2005     TD (ADULT, 7+) 10/28/2005     TDAP Vaccine (Adacel) 06/10/2016     Typhoid IM 10/28/2005         BP (!) 143/93 (BP Location: Right arm)  Temp 97.9  F (36.6  C) (Oral)  EXAM: deferred    For this patient reviewed needed immunizations as well as malaria prophylaxis if needed and symptomatic treatment for traveler's diarrhea as follows  Orders Placed This Encounter   Procedures     TYPHOID VACCINE, IM     HC FLU VAC PRESRV FREE QUAD SPLIT VIR 3+YRS IM       ASSESSMENT/PLAN:    ICD-10-CM    1. Counseling for travel Z71.89 TYPHOID VACCINE, IM     azithromycin (ZITHROMAX) 500 MG tablet     HC FLU VAC PRESRV FREE QUAD  SPLIT VIR 3+YRS IM   2. Need for vaccination Z23 TYPHOID VACCINE, IM     azithromycin (ZITHROMAX) 500 MG tablet     HC FLU VAC PRESRV FREE QUAD SPLIT VIR 3+YRS IM     I have reviewed general recommendations for safe travel   including: food/water precautions, insect avoidance,  roadway safety. Educational materials and [employer] report provided.    Total visit time for a family visit 60 minutes, with 15 minutes per patient, over 50% of time spent counseling patient.

## 2018-11-06 NOTE — NURSING NOTE
"Chief Complaint   Patient presents with     [facility] Clinic     initial BP (!) 143/93 (BP Location: Right arm)  Temp 97.9  F (36.6  C) (Oral) Estimated body mass index is 28.19 kg/(m^2) as calculated from the following:    Height as of 6/28/17: 5' 4.75\" (1.645 m).    Weight as of 6/28/17: 168 lb 1.6 oz (76.2 kg).  BP completed using cuff size: regular.   R arm      Health Maintenance that is potentially due pending provider review:  NONE    n/a    Thad Vance ma  "

## 2018-11-09 DIAGNOSIS — Z13.0 SCREENING FOR DEFICIENCY ANEMIA: ICD-10-CM

## 2018-11-09 DIAGNOSIS — Z13.6 CARDIOVASCULAR SCREENING; LDL GOAL LESS THAN 160: Primary | ICD-10-CM

## 2018-11-09 DIAGNOSIS — Z00.00 ROUTINE GENERAL MEDICAL EXAMINATION AT A HEALTH CARE FACILITY: ICD-10-CM

## 2018-11-09 DIAGNOSIS — Z13.1 SCREENING FOR DIABETES MELLITUS (DM): ICD-10-CM

## 2018-11-21 ENCOUNTER — RADIANT APPOINTMENT (OUTPATIENT)
Dept: MAMMOGRAPHY | Facility: CLINIC | Age: 53
End: 2018-11-21
Attending: FAMILY MEDICINE
Payer: COMMERCIAL

## 2018-11-21 ENCOUNTER — OFFICE VISIT (OUTPATIENT)
Dept: PEDIATRICS | Facility: CLINIC | Age: 53
End: 2018-11-21
Payer: COMMERCIAL

## 2018-11-21 VITALS
BODY MASS INDEX: 28.16 KG/M2 | HEART RATE: 62 BPM | TEMPERATURE: 97.5 F | OXYGEN SATURATION: 100 % | WEIGHT: 169 LBS | HEIGHT: 65 IN | SYSTOLIC BLOOD PRESSURE: 130 MMHG | DIASTOLIC BLOOD PRESSURE: 80 MMHG

## 2018-11-21 DIAGNOSIS — Z13.6 CARDIOVASCULAR SCREENING; LDL GOAL LESS THAN 160: ICD-10-CM

## 2018-11-21 DIAGNOSIS — R03.0 ELEVATED BP WITHOUT DIAGNOSIS OF HYPERTENSION: ICD-10-CM

## 2018-11-21 DIAGNOSIS — E66.3 OVERWEIGHT (BMI 25.0-29.9): ICD-10-CM

## 2018-11-21 DIAGNOSIS — L81.9 PIGMENTED SKIN LESION: ICD-10-CM

## 2018-11-21 DIAGNOSIS — Z80.3 FAMILY HISTORY OF BREAST CANCER: ICD-10-CM

## 2018-11-21 DIAGNOSIS — Z13.0 SCREENING FOR DEFICIENCY ANEMIA: ICD-10-CM

## 2018-11-21 DIAGNOSIS — Z13.1 SCREENING FOR DIABETES MELLITUS (DM): ICD-10-CM

## 2018-11-21 DIAGNOSIS — Z12.39 SCREENING BREAST EXAMINATION: ICD-10-CM

## 2018-11-21 DIAGNOSIS — Z11.4 SCREENING FOR HUMAN IMMUNODEFICIENCY VIRUS: ICD-10-CM

## 2018-11-21 DIAGNOSIS — Z00.00 ROUTINE GENERAL MEDICAL EXAMINATION AT A HEALTH CARE FACILITY: Primary | ICD-10-CM

## 2018-11-21 DIAGNOSIS — Z00.00 ROUTINE GENERAL MEDICAL EXAMINATION AT A HEALTH CARE FACILITY: ICD-10-CM

## 2018-11-21 LAB
ALBUMIN SERPL-MCNC: 3.8 G/DL (ref 3.4–5)
ALP SERPL-CCNC: 55 U/L (ref 40–150)
ALT SERPL W P-5'-P-CCNC: 20 U/L (ref 0–50)
ANION GAP SERPL CALCULATED.3IONS-SCNC: 6 MMOL/L (ref 3–14)
AST SERPL W P-5'-P-CCNC: 16 U/L (ref 0–45)
BASOPHILS # BLD AUTO: 0 10E9/L (ref 0–0.2)
BASOPHILS NFR BLD AUTO: 0.6 %
BILIRUB SERPL-MCNC: 0.6 MG/DL (ref 0.2–1.3)
BUN SERPL-MCNC: 9 MG/DL (ref 7–30)
CALCIUM SERPL-MCNC: 8.9 MG/DL (ref 8.5–10.1)
CHLORIDE SERPL-SCNC: 105 MMOL/L (ref 94–109)
CHOLEST SERPL-MCNC: 187 MG/DL
CO2 SERPL-SCNC: 27 MMOL/L (ref 20–32)
CREAT SERPL-MCNC: 0.74 MG/DL (ref 0.52–1.04)
DIFFERENTIAL METHOD BLD: NORMAL
EOSINOPHIL # BLD AUTO: 0.1 10E9/L (ref 0–0.7)
EOSINOPHIL NFR BLD AUTO: 1.4 %
ERYTHROCYTE [DISTWIDTH] IN BLOOD BY AUTOMATED COUNT: 12.6 % (ref 10–15)
GFR SERPL CREATININE-BSD FRML MDRD: 83 ML/MIN/1.7M2
GLUCOSE SERPL-MCNC: 87 MG/DL (ref 70–99)
HCT VFR BLD AUTO: 39 % (ref 35–47)
HDLC SERPL-MCNC: 76 MG/DL
HGB BLD-MCNC: 12.8 G/DL (ref 11.7–15.7)
IMM GRANULOCYTES # BLD: 0 10E9/L (ref 0–0.4)
IMM GRANULOCYTES NFR BLD: 0.5 %
LDLC SERPL CALC-MCNC: 81 MG/DL
LYMPHOCYTES # BLD AUTO: 1.3 10E9/L (ref 0.8–5.3)
LYMPHOCYTES NFR BLD AUTO: 20 %
MCH RBC QN AUTO: 30.6 PG (ref 26.5–33)
MCHC RBC AUTO-ENTMCNC: 32.8 G/DL (ref 31.5–36.5)
MCV RBC AUTO: 93 FL (ref 78–100)
MONOCYTES # BLD AUTO: 0.5 10E9/L (ref 0–1.3)
MONOCYTES NFR BLD AUTO: 6.8 %
NEUTROPHILS # BLD AUTO: 4.7 10E9/L (ref 1.6–8.3)
NEUTROPHILS NFR BLD AUTO: 70.7 %
NONHDLC SERPL-MCNC: 111 MG/DL
PLATELET # BLD AUTO: 210 10E9/L (ref 150–450)
POTASSIUM SERPL-SCNC: 3.9 MMOL/L (ref 3.4–5.3)
PROT SERPL-MCNC: 7.4 G/DL (ref 6.8–8.8)
RBC # BLD AUTO: 4.18 10E12/L (ref 3.8–5.2)
SODIUM SERPL-SCNC: 138 MMOL/L (ref 133–144)
TRIGL SERPL-MCNC: 149 MG/DL
WBC # BLD AUTO: 6.6 10E9/L (ref 4–11)

## 2018-11-21 PROCEDURE — 85025 COMPLETE CBC W/AUTO DIFF WBC: CPT | Performed by: FAMILY MEDICINE

## 2018-11-21 PROCEDURE — 87389 HIV-1 AG W/HIV-1&-2 AB AG IA: CPT | Performed by: FAMILY MEDICINE

## 2018-11-21 PROCEDURE — 80061 LIPID PANEL: CPT | Performed by: FAMILY MEDICINE

## 2018-11-21 PROCEDURE — 80053 COMPREHEN METABOLIC PANEL: CPT | Performed by: FAMILY MEDICINE

## 2018-11-21 PROCEDURE — 36415 COLL VENOUS BLD VENIPUNCTURE: CPT | Performed by: FAMILY MEDICINE

## 2018-11-21 PROCEDURE — 99396 PREV VISIT EST AGE 40-64: CPT | Performed by: FAMILY MEDICINE

## 2018-11-21 PROCEDURE — 77067 SCR MAMMO BI INCL CAD: CPT | Performed by: STUDENT IN AN ORGANIZED HEALTH CARE EDUCATION/TRAINING PROGRAM

## 2018-11-21 ASSESSMENT — PAIN SCALES - GENERAL: PAINLEVEL: NO PAIN (0)

## 2018-11-21 NOTE — PATIENT INSTRUCTIONS
SCHEDULE FOR SKIN CONSULT        Preventive Health Recommendations  Female Ages 50 - 64    Yearly exam: See your health care provider every year in order to  o Review health changes.   o Discuss preventive care.    o Review your medicines if your doctor has prescribed any.      Get a Pap test every three years (unless you have an abnormal result and your provider advises testing more often).    If you get Pap tests with HPV test, you only need to test every 5 years, unless you have an abnormal result.     You do not need a Pap test if your uterus was removed (hysterectomy) and you have not had cancer.    You should be tested each year for STDs (sexually transmitted diseases) if you're at risk.     Have a mammogram every 1 to 2 years.    Have a colonoscopy at age 50, or have a yearly FIT test (stool test). These exams screen for colon cancer.      Have a cholesterol test every 5 years, or more often if advised.    Have a diabetes test (fasting glucose) every three years. If you are at risk for diabetes, you should have this test more often.     If you are at risk for osteoporosis (brittle bone disease), think about having a bone density scan (DEXA).    Shots: Get a flu shot each year. Get a tetanus shot every 10 years.    Nutrition:     Eat at least 5 servings of fruits and vegetables each day.    Eat whole-grain bread, whole-wheat pasta and brown rice instead of white grains and rice.    Get adequate Calcium and Vitamin D.     Lifestyle    Exercise at least 150 minutes a week (30 minutes a day, 5 days a week). This will help you control your weight and prevent disease.    Limit alcohol to one drink per day.    No smoking.     Wear sunscreen to prevent skin cancer.     See your dentist every six months for an exam and cleaning.    See your eye doctor every 1 to 2 years.

## 2018-11-21 NOTE — PROGRESS NOTES
SUBJECTIVE:   CC: Sheri Montoya is an 53 year old woman who presents for preventive health visit.     Healthy Habits:    Do you get at least three servings of calcium containing foods daily (dairy, green leafy vegetables, etc.)? yes    Amount of exercise or daily activities, outside of work: 5 day(s) per week    Problems taking medications regularly NA    Medication side effects: No    Have you had an eye exam in the past two years? yes    Do you see a dentist twice per year? yes    Do you have sleep apnea, excessive snoring or daytime drowsiness?no        Today's PHQ-2 Score:   PHQ-2 ( 1999 Pfizer) 11/21/2018 6/28/2017   Q1: Little interest or pleasure in doing things 0 0   Q2: Feeling down, depressed or hopeless 0 0   PHQ-2 Score 0 0     Abuse: Current or Past(Physical, Sexual or Emotional)- No  Do you feel safe in your environment - Yes    Social History   Substance Use Topics     Smoking status: Never Smoker     Smokeless tobacco: Never Used     Alcohol use 3.5 oz/week     7 Standard drinks or equivalent per week     If you drink alcohol do you typically have >3 drinks per day or >7 drinks per week? No                     Reviewed orders with patient.  Reviewed health maintenance and updated orders accordingly - Yes  Labs reviewed in EPIC  BP Readings from Last 3 Encounters:   11/21/18 130/80   11/06/18 (!) 143/93   09/18/17 125/79    Wt Readings from Last 3 Encounters:   11/21/18 169 lb (76.7 kg)   06/28/17 168 lb 1.6 oz (76.2 kg)   06/10/16 175 lb 3.2 oz (79.5 kg)                  Patient Active Problem List   Diagnosis     CARDIOVASCULAR SCREENING; LDL GOAL LESS THAN 160     Family history of breast cancer     Overweight (BMI 25.0-29.9)     ASCUS with positive high risk HPV cervical     Elevated BP without diagnosis of hypertension     Past Surgical History:   Procedure Laterality Date     COLONOSCOPY N/A 9/18/2017    Procedure: COMBINED COLONOSCOPY, SINGLE OR MULTIPLE BIOPSY/POLYPECTOMY BY BIOPSY;;   Surgeon: Milan Cameron MD;  Location: [facility]     COLONOSCOPY WITH CO2 INSUFFLATION N/A 9/18/2017    Procedure: COLONOSCOPY WITH CO2 INSUFFLATION;  Screening for colon cancer  Routine general medical examination at a health care facility  bmi 28.19  Pharmacy: [facility]  [place]  ;  Surgeon: Milan Cameron MD;  Location: [facility]     CYSTECTOMY PILONIDAL  1996     HC TOOTH EXTRACTION W/FORCEP  2002?     SURGICAL HISTORY OF -       infertility workup-hysteroscope        Social History   Substance Use Topics     Smoking status: Never Smoker     Smokeless tobacco: Never Used     Alcohol use 3.5 oz/week     7 Standard drinks or equivalent per week     Family History   Problem Relation Age of Onset     Hypertension Father      Lipids Father      Diabetes Maternal Grandfather      Cerebrovascular Disease Maternal Grandmother      Breast Cancer Mother      Cancer Mother 73     tumors      C.A.D. Paternal Uncle      Asthma No family hx of      Cancer - colorectal No family hx of      Prostate Cancer No family hx of          Current Outpatient Prescriptions   Medication Sig Dispense Refill     Multiple Vitamins-Minerals (DAILY MULTIVITAMIN PO) Take 1 tablet by mouth daily       azithromycin (ZITHROMAX) 500 MG tablet Take one tablet daily for up to 3 days as needed for traveler's diarrhea 3 tablet 0     No Known Allergies  Recent Labs   Lab Test  11/21/18   1319  06/28/17   1023  06/10/16   0935  04/07/15   1114   LDL  81  70  82   --    HDL  76  76  53   --    TRIG  149  74  103   --    ALT  20  27   --   34   CR  0.74  0.79   --   0.79   GFRESTIMATED  83  77   --   77   GFRESTBLACK  >90  >90  African American GFR Calc     --   >90   GFR Calc     POTASSIUM  3.9  3.7   --   3.8   TSH   --   1.80   --   1.97        Patient over age 50, mutual decision to screen reflected in health maintenance.    Pertinent mammograms are reviewed under the imaging tab.  History of abnormal Pap smear: NO - age 30- 65 PAP every 3  years recommended  PAP / HPV Latest Ref Rng & Units 2017 6/10/2016 2015   PAP - NIL OTHER-NIL, See Result NIL   HPV 16 DNA NEG Negative Negative Negative   HPV 18 DNA NEG Negative Negative Negative   OTHER HR HPV NEG Negative Negative Negative     Reviewed and updated as needed this visit by clinical staff  Tobacco  Allergies  Meds  Med Hx  Surg Hx  Fam Hx  Soc Hx        Reviewed and updated as needed this visit by Provider          Past Medical History:   Diagnosis Date     ASCUS with positive high risk HPV 14    + HR HPV 52     Breast cyst      Chicken pox      H/O colposcopy with cervical biopsy 2014    Neg for dysplasia     Infertility     s/p 3 rounds of IVF. Was pregnant only very briefly     Obesity      Positive TB test     Has been on INH 2012      Past Surgical History:   Procedure Laterality Date     COLONOSCOPY N/A 2017    Procedure: COMBINED COLONOSCOPY, SINGLE OR MULTIPLE BIOPSY/POLYPECTOMY BY BIOPSY;;  Surgeon: Milan Cameron MD;  Location: [facility]     COLONOSCOPY WITH CO2 INSUFFLATION N/A 2017    Procedure: COLONOSCOPY WITH CO2 INSUFFLATION;  Screening for colon cancer  Routine general medical examination at a health care facility  bmi 28.19  Pharmacy: [email or web address]  ;  Surgeon: Milan Cameron MD;  Location: [facility]     CYSTECTOMY PILONIDAL       HC TOOTH EXTRACTION W/FORCEP  ?     SURGICAL HISTORY OF -       infertility workup-hysteroscope      Obstetric History       T0      L0     SAB0   TAB0   Ectopic0   Multiple0   Live Births0           ROS:  CONSTITUTIONAL: NEGATIVE for fever, chills, change in weight  INTEGUMENTARY/SKIN: Pigmented skin lesion on the left temple  EYES: NEGATIVE for vision changes or irritation  ENT: NEGATIVE for ear, mouth and throat problems  RESP: NEGATIVE for significant cough or SOB  BREAST: NEGATIVE for masses, tenderness or discharge  CV: NEGATIVE for chest pain, palpitations or peripheral edema  GI:  "NEGATIVE for nausea, abdominal pain, heartburn, or change in bowel habits  : NEGATIVE for unusual urinary or vaginal symptoms. No vaginal bleeding.  MUSCULOSKELETAL: NEGATIVE for significant arthralgias or myalgia  NEURO: NEGATIVE for weakness, dizziness or paresthesias  ENDOCRINE: NEGATIVE for temperature intolerance, skin/hair changes  HEME/ALLERGY/IMMUNE: NEGATIVE for bleeding problems  PSYCHIATRIC: NEGATIVE for changes in mood or affect     OBJECTIVE:   /80  Pulse 62  Temp 97.5  F (36.4  C) (Oral)  Ht 5' 4.5\" (1.638 m)  Wt 169 lb (76.7 kg)  SpO2 100%  BMI 28.56 kg/m2  EXAM:  GENERAL APPEARANCE: healthy, alert and no distress  EYES: Eyes grossly normal to inspection, PERRL and conjunctivae and sclerae normal  HENT: ear canals and TM's normal, nose and mouth without ulcers or lesions, oropharynx clear and oral mucous membranes moist  NECK: no adenopathy, no asymmetry, masses, or scars and thyroid normal to palpation  RESP: lungs clear to auscultation - no rales, rhonchi or wheezes  BREAST: normal without masses, tenderness or nipple discharge and no palpable axillary masses or adenopathy  CV: regular rate and rhythm, normal S1 S2, no S3 or S4, no murmur, click or rub, no peripheral edema and peripheral pulses strong  ABDOMEN: soft, nontender, no hepatosplenomegaly, no masses and bowel sounds normal   (female): normal female external genitalia, normal urethral meatus, vaginal mucosal atrophy noted, normal cervix, adnexae, and uterus without masses or abnormal discharge  MS: no musculoskeletal defects are noted and gait is age appropriate without ataxia  SKIN: About 1-2 cm, course, hyperpigmented raised skin lesion likely seborrheic keratosis  NEURO: Normal strength and tone, sensory exam grossly normal, mentation intact and speech normal  PSYCH: mentation appears normal and affect normal/bright    Diagnostic Test Results:  Results for orders placed or performed in visit on 11/21/18 (from the past 24 " hour(s))   CBC with platelets differential   Result Value Ref Range    WBC 6.6 4.0 - 11.0 10e9/L    RBC Count 4.18 3.8 - 5.2 10e12/L    Hemoglobin 12.8 11.7 - 15.7 g/dL    Hematocrit 39.0 35.0 - 47.0 %    MCV 93 78 - 100 fl    MCH 30.6 26.5 - 33.0 pg    MCHC 32.8 31.5 - 36.5 g/dL    RDW 12.6 10.0 - 15.0 %    Platelet Count 210 150 - 450 10e9/L    % Neutrophils 70.7 %    % Lymphocytes 20.0 %    % Monocytes 6.8 %    % Eosinophils 1.4 %    % Basophils 0.6 %    % Immature Granulocytes 0.5 %    Absolute Neutrophil 4.7 1.6 - 8.3 10e9/L    Absolute Lymphocytes 1.3 0.8 - 5.3 10e9/L    Absolute Monocytes 0.5 0.0 - 1.3 10e9/L    Absolute Eosinophils 0.1 0.0 - 0.7 10e9/L    Absolute Basophils 0.0 0.0 - 0.2 10e9/L    Abs Immature Granulocytes 0.0 0 - 0.4 10e9/L    Diff Method Automated Method    **Comprehensive metabolic panel FUTURE anytime   Result Value Ref Range    Sodium 138 133 - 144 mmol/L    Potassium 3.9 3.4 - 5.3 mmol/L    Chloride 105 94 - 109 mmol/L    Carbon Dioxide 27 20 - 32 mmol/L    Anion Gap 6 3 - 14 mmol/L    Glucose 87 70 - 99 mg/dL    Urea Nitrogen 9 7 - 30 mg/dL    Creatinine 0.74 0.52 - 1.04 mg/dL    GFR Estimate 83 >60 mL/min/1.7m2    GFR Estimate If Black >90 >60 mL/min/1.7m2    Calcium 8.9 8.5 - 10.1 mg/dL    Bilirubin Total 0.6 0.2 - 1.3 mg/dL    Albumin 3.8 3.4 - 5.0 g/dL    Protein Total 7.4 6.8 - 8.8 g/dL    Alkaline Phosphatase 55 40 - 150 U/L    ALT 20 0 - 50 U/L    AST 16 0 - 45 U/L   Lipid panel reflex to direct LDL Fasting   Result Value Ref Range    Cholesterol 187 <200 mg/dL    Triglycerides 149 <150 mg/dL    HDL Cholesterol 76 >49 mg/dL    LDL Cholesterol Calculated 81 <100 mg/dL    Non HDL Cholesterol 111 <130 mg/dL       ASSESSMENT/PLAN:   1. Routine general medical examination at a health care facility  Discussed on regular exercises, daily calcium intake, healthy eating, self breast exams monthly and routine dental checks    2. Elevated BP without diagnosis of hypertension  BP Readings  "from Last 6 Encounters:   18 130/80   18 (!) 143/93   17 125/79   17 126/78   06/10/16 128/86   04/07/15 (!) 146/95     Initial blood pressure was 153/97  Rechecked-130/80  Will follow low salt diet, weight loss and regular exercises.  Recheck in 1 year or sooner if needed    3. CARDIOVASCULAR SCREENING; LDL GOAL LESS THAN 160  LDL Cholesterol Calculated   Date Value Ref Range Status   2018 81 <100 mg/dL Final     Comment:     Desirable:       <100 mg/dl         4. Overweight (BMI 25.0-29.9)  Wt Readings from Last 5 Encounters:   18 169 lb (76.7 kg)   17 168 lb 1.6 oz (76.2 kg)   06/10/16 175 lb 3.2 oz (79.5 kg)   04/07/15 182 lb (82.6 kg)   14 178 lb (80.7 kg)     Emphasized on weight loss, portion control, low calorie and low fat diet, healthy eating, regular exercises.      5. Family history of breast cancer  Continue with annual mammograms    6. Pigmented skin lesion  Recommended to consult dermatology for further evaluation  - DERMATOLOGY REFERRAL    COUNSELING:   Reviewed preventive health counseling, as reflected in patient instructions  Special attention given to:        Regular exercise       Healthy diet/nutrition       Vision screening       Osteoporosis Prevention/Bone Health       Colon cancer screening       HIV screeninx in teen years, 1x in adult years, and at intervals if high risk       Advance Care Planning    BP Readings from Last 1 Encounters:   18 130/80     Estimated body mass index is 28.56 kg/(m^2) as calculated from the following:    Height as of this encounter: 5' 4.5\" (1.638 m).    Weight as of this encounter: 169 lb (76.7 kg).    BP Screening:   Last 3 BP Readings:    BP Readings from Last 3 Encounters:   18 130/80   18 (!) 143/93   17 125/79       The following was recommended to the patient:  Re-screen BP within a year and recommended lifestyle modifications  Weight management plan: Discussed healthy diet and " exercise guidelines and patient will follow up in 12 months in clinic to re-evaluate.     reports that she has never smoked. She has never used smokeless tobacco.      Counseling Resources:  ATP IV Guidelines  Pooled Cohorts Equation Calculator  Breast Cancer Risk Calculator  FRAX Risk Assessment  ICSI Preventive Guidelines  Dietary Guidelines for Americans, 2010  USDA's MyPlate  ASA Prophylaxis  Lung CA Screening    Ary Saldivar MD  [facility]  Chart documentation done in part with Dragon Voice recognition Software. Although reviewed after completion, some word and grammatical error may remain.

## 2018-11-21 NOTE — MR AVS SNAPSHOT
After Visit Summary   11/21/2018    Sheri Montoya    MRN: [id number]           Patient Information     Date Of Birth          1965        Visit Information        Provider Department      11/21/2018 2:10 PM Ary Saldivar MD [facility]        Today's Diagnoses     Routine general medical examination at a health care facility    -  1    Elevated BP without diagnosis of hypertension        CARDIOVASCULAR SCREENING; LDL GOAL LESS THAN 160        Overweight (BMI 25.0-29.9)        Family history of breast cancer        Pigmented skin lesion          Care Instructions      SCHEDULE FOR SKIN CONSULT        Preventive Health Recommendations  Female Ages 50 - 64    Yearly exam: See your health care provider every year in order to  o Review health changes.   o Discuss preventive care.    o Review your medicines if your doctor has prescribed any.      Get a Pap test every three years (unless you have an abnormal result and your provider advises testing more often).    If you get Pap tests with HPV test, you only need to test every 5 years, unless you have an abnormal result.     You do not need a Pap test if your uterus was removed (hysterectomy) and you have not had cancer.    You should be tested each year for STDs (sexually transmitted diseases) if you're at risk.     Have a mammogram every 1 to 2 years.    Have a colonoscopy at age 50, or have a yearly FIT test (stool test). These exams screen for colon cancer.      Have a cholesterol test every 5 years, or more often if advised.    Have a diabetes test (fasting glucose) every three years. If you are at risk for diabetes, you should have this test more often.     If you are at risk for osteoporosis (brittle bone disease), think about having a bone density scan (DEXA).    Shots: Get a flu shot each year. Get a tetanus shot every 10 years.    Nutrition:     Eat at least 5 servings of fruits and vegetables each day.    Eat whole-grain  bread, whole-wheat pasta and brown rice instead of white grains and rice.    Get adequate Calcium and Vitamin D.     Lifestyle    Exercise at least 150 minutes a week (30 minutes a day, 5 days a week). This will help you control your weight and prevent disease.    Limit alcohol to one drink per day.    No smoking.     Wear sunscreen to prevent skin cancer.     See your dentist every six months for an exam and cleaning.    See your eye doctor every 1 to 2 years.            Follow-ups after your visit        Additional Services     DERMATOLOGY REFERRAL       Your provider has referred you to: [facility]    Please be aware that coverage of these services is subject to the terms and limitations of your health insurance plan.  Call member services at your health plan with any benefit or coverage questions.      Please bring the following with you to your appointment:    (1) Any X-Rays, CTs or MRIs which have been performed.  Contact the facility where they were done to arrange for  prior to your scheduled appointment.    (2) List of current medications  (3) This referral request   (4) Any documents/labs given to you for this referral                  Who to contact     If you have questions or need follow up information about today's clinic visit or your schedule please contact [facility] directly at [phone].  Normal or non-critical lab and imaging results will be communicated to you by MyChart, letter or phone within 4 business days after the clinic has received the results. If you do not hear from us within 7 days, please contact the clinic through MyChart or phone. If you have a critical or abnormal lab result, we will notify you by phone as soon as possible.  Submit refill requests through [employer] or call your pharmacy and they will forward the refill request to us. Please allow 3 business days for your refill to be completed.          Additional Information About Your Visit        MyChart  "Information     St[employer]chr is an electronic gateway that provides easy, online access to your medical records. With [employer]chr, you can request a clinic appointment, read your test results, renew a prescription or communicate with your care team.     To sign up for [employer] visit the website at [email or web address]   You will be asked to enter the access code listed below, as well as some personal information. Please follow the directions to create your username and password.     Your access code is: [email or web address]  Expires: 2019  4:43 PM     Your access code will  in 90 days. If you need help or a new code, please contact your [facility] or call [phone] for assistance.        Care EveryWhere ID     This is your Care EveryWhere ID. This could be used by other organizations to access your [facility] medical records  [id number]        Your Vitals Were     Pulse Temperature Height Pulse Oximetry BMI (Body Mass Index)       62 97.5  F (36.4  C) (Oral) 5' 4.5\" (1.638 m) 100% 28.56 kg/m2        Blood Pressure from Last 3 Encounters:   18 130/80   18 (!) 143/93   17 125/79    Weight from Last 3 Encounters:   18 169 lb (76.7 kg)   17 168 lb 1.6 oz (76.2 kg)   06/10/16 175 lb 3.2 oz (79.5 kg)              We Performed the Following     DERMATOLOGY REFERRAL        Primary Care Provider Office Phone # Fax #    Ary Saldivar [phone]       [address]  [facility] [address]        Equal Access to Services     [facility]: Hadii ryan Sanchez, angi hansen, qaybta artemioalmarcus candelaria . So [facility] [phone].    ATENCIÓN: Si habla español, tiene a martinez disposición servicios gratuitos de asistencia lingüística. Llame al [phone].    We comply with applicable federal civil rights laws and [place] laws. We do not discriminate on the basis of race, color, national origin, " age, disability, sex, sexual orientation, or gender identity.            Thank you!     Thank you for choosing [facility]  for your care. Our goal is always to provide you with excellent care. Hearing back from our patients is one way we can continue to improve our services. Please take a few minutes to complete the written survey that you may receive in the mail after your visit with us. Thank you!             Your Updated Medication List - Protect others around you: Learn how to safely use, store and throw away your medicines at www.disposemymeds.org.          This list is accurate as of 11/21/18  2:18 PM.  Always use your most recent med list.                   Brand Name Dispense Instructions for use Diagnosis    azithromycin 500 MG tablet    ZITHROMAX    3 tablet    Take one tablet daily for up to 3 days as needed for traveler's diarrhea    Counseling for travel, Need for vaccination       DAILY MULTIVITAMIN PO      Take 1 tablet by mouth daily

## 2018-11-23 LAB — HIV 1+2 AB+HIV1 P24 AG SERPL QL IA: NONREACTIVE

## 2019-01-22 ENCOUNTER — TELEPHONE (OUTPATIENT)
Dept: DERMATOLOGY | Facility: CLINIC | Age: 54
End: 2019-01-22

## 2019-01-22 NOTE — TELEPHONE ENCOUNTER
Dermatology Pre-visit Call:    Reason for visit : skin check     Any personal history of skin cancers: No    Was the patient referred: Dr Saldivar    If the patient was referred, are records obtained: Yes. (If no, then obtain records).    Has the patient seen a dermatologist in the past: No. (If yes, obtain records)    Patient Reminders Given:  --Please, make sure you bring an updated list of your medications.   --Plan on being in our facility for approximately one hour, this includes the registration process, office visit, education and check-out process.  If you are having a procedure, more time may be required.     --If you are having a procedure, please, present 15 minutes early.  --Location reviewed.   --If you need to cancel or reschedule, call [phone]  --We look forward to seeing you in Dermatology Clinic.       Indira Novak LPN

## 2019-01-23 ENCOUNTER — OFFICE VISIT (OUTPATIENT)
Dept: DERMATOLOGY | Facility: CLINIC | Age: 54
End: 2019-01-23
Attending: FAMILY MEDICINE
Payer: COMMERCIAL

## 2019-01-23 DIAGNOSIS — D22.9 MULTIPLE BENIGN NEVI: ICD-10-CM

## 2019-01-23 DIAGNOSIS — D18.01 CHERRY ANGIOMA: ICD-10-CM

## 2019-01-23 DIAGNOSIS — D48.9 NEOPLASM OF UNCERTAIN BEHAVIOR: Primary | ICD-10-CM

## 2019-01-23 DIAGNOSIS — L81.4 SOLAR LENTIGO: ICD-10-CM

## 2019-01-23 DIAGNOSIS — L73.2 HIDRADENITIS SUPPURATIVA: ICD-10-CM

## 2019-01-23 DIAGNOSIS — L57.8 SUN-DAMAGED SKIN: ICD-10-CM

## 2019-01-23 PROCEDURE — 88305 TISSUE EXAM BY PATHOLOGIST: CPT | Mod: TC | Performed by: DERMATOLOGY

## 2019-01-23 PROCEDURE — 11102 TANGNTL BX SKIN SINGLE LES: CPT | Performed by: DERMATOLOGY

## 2019-01-23 PROCEDURE — 99203 OFFICE O/P NEW LOW 30 MIN: CPT | Mod: 25 | Performed by: DERMATOLOGY

## 2019-01-23 RX ORDER — CLINDAMYCIN PHOSPHATE 10 UG/ML
LOTION TOPICAL
Qty: 60 ML | Refills: 11 | Status: SHIPPED | OUTPATIENT
Start: 2019-01-23 | End: 2020-10-19

## 2019-01-23 ASSESSMENT — PAIN SCALES - GENERAL: PAINLEVEL: NO PAIN (0)

## 2019-01-23 NOTE — NURSING NOTE
Sheri Montoya's goals for this visit include:   Chief Complaint   Patient presents with     Skin Check     full skin check, Pigmented skin lesion left foreward       She requests these members of her care team be copied on today's visit information: Yes    PCP: Ary Saldivar    Referring Provider:  Ary Saldivar MD  [address] [place] [address]    There were no vitals taken for this visit.    Do you need any medication refills at today's visit? No    Daren Cisneros CMA ([facility])

## 2019-01-23 NOTE — PATIENT INSTRUCTIONS
Start over-the-counter benzoyl peroxide 10% wash as a body wash.  If 10% is too irritating you can use the 5%. ([employer] makes this product. It is available here at the pharmacy or at [employer]). This medication can bleach your towels and clothing.     It is found in a purple tube in the acne aisle.     After a shower and using the benzoyl peroxide wash, apply clindamycin 1% lotion to any boils or tender areas in the underarms, inframammary skin, and inner thighs.                     Wound Care After a Biopsy    What is a skin biopsy?  A skin biopsy allows the doctor to examine a very small piece of tissue under the microscope to determine the diagnosis and the best treatment for the skin condition. A local anesthetic (numbing medicine)  is injected with a very small needle into the skin area to be tested. A small piece of skin is taken from the area. Sometimes a suture (stitch) is used.     What are the risks of a skin biopsy?  I will experience scar, bleeding, swelling, pain, crusting and redness. I may experience incomplete removal or recurrence. Risks of this procedure are excessive bleeding, bruising, infection, nerve damage, numbness, thick (hypertrophic or keloidal) scar and non-diagnostic biopsy.    How should I care for my wound for the first 24 hours?    Keep the wound dry and covered for 24 hours    If it bleeds, hold direct pressure on the area for 15 minutes. If bleeding does not stop then go to the emergency room    Avoid strenuous exercise the first 1-2 days or as your doctor instructs you    How should I care for the wound after 24 hours?    After 24 hours, remove the bandage    You may bathe or shower as normal    If you had a scalp biopsy, you can shampoo as usual and can use shower water to clean the biopsy site daily    Clean the wound twice a day with gentle soap and water    Do not scrub, be gentle    Apply white petroleum/Vaseline after cleaning the wound with a cotton swab or a clean finger,  and keep the site covered with a Bandaid /bandage. Bandages are not necessary with a scalp biopsy    If you are unable to cover the site with a Bandaid /bandage, re-apply ointment 2-3 times a day to keep the site moist. Moisture will help with healing    Avoid strenuous activity for first 1-2 days    Avoid lakes, rivers, pools, and oceans until the stitches are removed or the site is healed    How do I clean my wound?    Wash hands thoroughly with soap or use hand  before all wound care    Clean the wound with gentle soap and water    Apply white petroleum/Vaseline  to wound after it is clean    Replace the Bandaid /bandage to keep the wound covered for the first few days or as instructed by your doctor    If you had a scalp biopsy, warm shower water to the area on a daily basis should suffice    What should I use to clean my wound?     Cotton-tipped applicators (Qtips )    White petroleum jelly (Vaseline ). Use a clean new container and use Q-tips to apply.    Bandaids   as needed    Gentle soap     How should I care for my wound long term?    Do not get your wound dirty    Keep up with wound care for one week or until the area is healed.    A small scab will form and fall off by itself when the area is completely healed. The area will be red and will become pink in color as it heals. Sun protection is very important for how your scar will turn out. Sunscreen with an SPF 30 or greater is recommended once the area is healed.    You should have some soreness but it should be mild and slowly go away over several days. Talk to your doctor about using tylenol for pain,    When should I call my doctor?  If you have increased:     Pain or swelling    Pus or drainage (clear or slightly yellow drainage is ok)    Temperature over 100F    Spreading redness or warmth around wound    When will I hear about my results?  The biopsy results can take 2-3 weeks to come back. The clinic will call you with the results, send  you a mychart message, or have you schedule a follow-up clinic or phone time to discuss the results. Contact our clinics if you do not hear from us in 3 weeks.     Who should I call with questions?    [facility]: [phone]     [facility]: [phone]    For urgent needs outside of business hours call the [facility] at [phone] and ask for the dermatology resident on call

## 2019-01-23 NOTE — PROGRESS NOTES
[facility] Dermatology Note      Dermatology Problem List:  1. NUB, left lateral forehead - s/p biopsy 1/23/2019  2. Hidradenitis suppurativa, stage 1: recommended BPO wash, clindamycin 1% lotion    Encounter Date: Jan 23, 2019    CC:  Chief Complaint   Patient presents with     Skin Check     full skin check, Pigmented skin lesion left foreward         History of Present Illness:  Ms. Sheri Montoya is a 53 year old female who presents as a referral from Dr. Saldivar for a skin check. She has an area of concern on her left forehead. She has had it a couple years. It has never bled or hurt. Patient prefers wearing protective clothing instead of sunscreen. Patient recently traveled to [place] with her family on vacation, did get a tan while there. She gets boils in her underarms, inframammary skin and inner thighs. She does not know of any family history of skin cancer. No other concerns addressed today.       Past Medical History:   Patient Active Problem List   Diagnosis     CARDIOVASCULAR SCREENING; LDL GOAL LESS THAN 160     Family history of breast cancer     Overweight (BMI 25.0-29.9)     ASCUS with positive high risk HPV cervical     Elevated BP without diagnosis of hypertension     Past Medical History:   Diagnosis Date     ASCUS with positive high risk HPV 7/23/14    + HR HPV 53     Breast cyst      Chicken pox      H/O colposcopy with cervical biopsy 9/2014    Neg for dysplasia     Infertility     s/p 3 rounds of IVF. Was pregnant only very briefly     Obesity      Positive TB test     Has been on INH Janurary 2012     Past Surgical History:   Procedure Laterality Date     COLONOSCOPY N/A 9/18/2017    Procedure: COMBINED COLONOSCOPY, SINGLE OR MULTIPLE BIOPSY/POLYPECTOMY BY BIOPSY;;  Surgeon: Milan Cameron MD;  Location: [facility] OR     COLONOSCOPY WITH CO2 INSUFFLATION N/A 9/18/2017    Procedure: COLONOSCOPY WITH CO2 INSUFFLATION;  Screening for colon cancer  Routine general medical  examination at a health care facility  bmi 28.19  Pharmacy: [facility] - [place]  ;  Surgeon: Milan Cameron MD;  Location: [facility] OR     CYSTECTOMY PILONIDAL  1996     HC TOOTH EXTRACTION W/FORCEP  2002?     SURGICAL HISTORY OF -       infertility workup-hysteroscope        Social History:  Patient reports that  has never smoked. she has never used smokeless tobacco. She reports that she drinks about 3.5 oz of alcohol per week. She reports that she does not use drugs. Patient works as . Patient has history of tanning bed usage, only once for an event in 1989. She is  with two adopted children.     Family History:  Family History   Problem Relation Age of Onset     Hypertension Father      Lipids Father      Diabetes Maternal Grandfather      Cerebrovascular Disease Maternal Grandmother      Breast Cancer Mother      Cancer Mother 73        tumors      C.A.D. Paternal Uncle      Asthma No family hx of      Cancer - colorectal No family hx of      Prostate Cancer No family hx of        Medications:  Current Outpatient Medications   Medication Sig Dispense Refill     Multiple Vitamins-Minerals (DAILY MULTIVITAMIN PO) Take 1 tablet by mouth daily         No Known Allergies    Review of Systems:  -Constitutional: Patient is otherwise feeling well, in usual state of health.   -Skin: As above in HPI. No additional skin concerns.    Physical exam:  Vitals: There were no vitals taken for this visit.  GEN: This is a well developed, well-nourished female in no acute distress, in a pleasant mood.    SKIN: Total skin excluding the undergarment areas was performed. The exam included the head/face, neck, both arms, chest, back, abdomen, both legs, digits and/or nails and buttocks.   - Arias Type II  - Scattered brown macules on sun exposed areas.  - Left temple, 2 cm x 1.3 cm pink pearly plaque with blue grey ovoid nest of pigmentation and arborizing vessels  -There are dome shaped bright red papules on  the trunk.   - Multiple regular brown pigmented macules and papules are identified on the trunk and extremities.   - Bilateral axilla, there are a few flat top pink papules consistent with resolved inflammatory lesions, no sinus track formation  - Under the breasts and bra line there are a few areas of double comedones  - medial thighs bilaterally pink erythema with a few resolving pink inflammatory papules, no sinus track formation   - No other lesions of concern on areas examined.             Impression/Plan:  1. Sun damaged skin with solar lentigines    Recommend sunscreens SPF #30 or greater, protective clothing and avoidance of tanning beds.    2. Benign findings including: cherry angioma    No further intervention required. Patient to report changes.     Patient reassured of the benign nature of these lesions.    3. Multiple clinically benign nevi    No further intervention required. Patient to report changes.     Patient reassured of the benign nature of these lesions.    4. NUB, left lateral forehead. 2 cm x 1.3 cm pink pearly plaque with blue grey ovoid nest of pigmentation and arborizing vessels. Ddx: BCC vs other    Shave biopsy:  After discussion of benefits and risks including but not limited to bleeding/bruising, pain/swelling, infection, scar, incomplete removal, nerve damage/numbness, recurrence, and non-diagnostic biopsy, written consent, verbal consent and photographs were obtained. Time-out was performed. The area was cleaned with isopropyl alcohol. 0.5ml of 1% lidocaine with 1:100,000 epinephrine was injected to obtain adequate anesthesia. A shave biopsy was performed. Hemostasis was achieved with aluminium chloride. Vaseline and a sterile dressing were applied. The patient tolerated the procedure and no complications were noted. The patient was provided with verbal and written post care instructions.    5. Hidradenitis suppurativa, [place] Stage I. Discussed the pathogenesis of these lesions.      Recommended BPO wash as a body was in the shower.     Prescribed clindamycin 1% lotion to apply as needed following shower.    Discussed only using clinda after washing with BPO to prevent bacterial resistance.    CC Ary Saldivar MD on close of this encounter.  Follow-up in 6 months, earlier for new or changing lesions.       Staff Involved:  Scribe/Staff    Scribe Disclosure  I, Sisi Dubois, am serving as a scribe to document services personally performed by Dr. Jonelle Finney MD, based on data collection and the provider's statements to me.     Provider Disclosure:   The documentation recorded by the scribe accurately reflects the services I personally performed and the decisions made by me.    Jonelle Finney MD    Department of Dermatology  [facility]: Phone: [phone], Fax:[phone]  [facility]: Phone: [phone], Fax: [phone]

## 2019-01-23 NOTE — LETTER
1/23/2019         RE: Sheri Montoya  [address] [address] [address]        Dear Colleague,    Thank you for referring your patient, Sheri Montoya, to the [facility]. Please see a copy of my visit note below.        [facility] Dermatology Note      Dermatology Problem List:  1. NUB, left lateral forehead - s/p biopsy 1/23/2019  2. Hidradenitis suppurativa, stage 1: recommended BPO wash, clindamycin 1% lotion    Encounter Date: Jan 23, 2019    CC:  Chief Complaint   Patient presents with     Skin Check     full skin check, Pigmented skin lesion left foreward         History of Present Illness:  Ms. Sheri Montoya is a 53 year old female who presents as a referral from Dr. Saldivar for a skin check. She has an area of concern on her left forehead. She has had it a couple years. It has never bled or hurt. Patient prefers wearing protective clothing instead of sunscreen. Patient recently traveled to [place] with her family on vacation, did get a tan while there. She gets boils in her underarms, inframammary skin and inner thighs. She does not know of any family history of skin cancer. No other concerns addressed today.       Past Medical History:   Patient Active Problem List   Diagnosis     CARDIOVASCULAR SCREENING; LDL GOAL LESS THAN 160     Family history of breast cancer     Overweight (BMI 25.0-29.9)     ASCUS with positive high risk HPV cervical     Elevated BP without diagnosis of hypertension     Past Medical History:   Diagnosis Date     ASCUS with positive high risk HPV 7/23/14    + HR HPV 53     Breast cyst      Chicken pox      H/O colposcopy with cervical biopsy 9/2014    Neg for dysplasia     Infertility     s/p 3 rounds of IVF. Was pregnant only very briefly     Obesity      Positive TB test     Has been on INH Janurary 2012     Past Surgical History:   Procedure Laterality Date     COLONOSCOPY N/A 9/18/2017    Procedure: COMBINED COLONOSCOPY,  SINGLE OR MULTIPLE BIOPSY/POLYPECTOMY BY BIOPSY;;  Surgeon: Milan Cameron MD;  Location: [facility] OR     COLONOSCOPY WITH CO2 INSUFFLATION N/A 9/18/2017    Procedure: COLONOSCOPY WITH CO2 INSUFFLATION;  Screening for colon cancer  Routine general medical examination at a health care facility  bmi 28.19  Pharmacy: [facility] - [place]  ;  Surgeon: Milan Cameron MD;  Location: [facility] OR     CYSTECTOMY PILONIDAL  1996     HC TOOTH EXTRACTION W/FORCEP  2002?     SURGICAL HISTORY OF -       infertility workup-hysteroscope        Social History:  Patient reports that  has never smoked. she has never used smokeless tobacco. She reports that she drinks about 3.5 oz of alcohol per week. She reports that she does not use drugs. Patient works as . Patient has history of tanning bed usage, only once for an event in 1989. She is  with two adopted children.     Family History:  Family History   Problem Relation Age of Onset     Hypertension Father      Lipids Father      Diabetes Maternal Grandfather      Cerebrovascular Disease Maternal Grandmother      Breast Cancer Mother      Cancer Mother 73        tumors      C.A.D. Paternal Uncle      Asthma No family hx of      Cancer - colorectal No family hx of      Prostate Cancer No family hx of        Medications:  Current Outpatient Medications   Medication Sig Dispense Refill     Multiple Vitamins-Minerals (DAILY MULTIVITAMIN PO) Take 1 tablet by mouth daily         No Known Allergies    Review of Systems:  -Constitutional: Patient is otherwise feeling well, in usual state of health.   -Skin: As above in HPI. No additional skin concerns.    Physical exam:  Vitals: There were no vitals taken for this visit.  GEN: This is a well developed, well-nourished female in no acute distress, in a pleasant mood.    SKIN: Total skin excluding the undergarment areas was performed. The exam included the head/face, neck, both arms, chest, back, abdomen, both legs, digits and/or  nails and buttocks.   - Arias Type II  - Scattered brown macules on sun exposed areas.  - Left temple, 2 cm x 1.3 cm pink pearly plaque with blue grey ovoid nest of pigmentation and arborizing vessels  -There are dome shaped bright red papules on the trunk.   - Multiple regular brown pigmented macules and papules are identified on the trunk and extremities.   - Bilateral axilla, there are a few flat top pink papules consistent with resolved inflammatory lesions, no sinus track formation  - Under the breasts and bra line there are a few areas of double comedones  - medial thighs bilaterally pink erythema with a few resolving pink inflammatory papules, no sinus track formation   - No other lesions of concern on areas examined.             Impression/Plan:  1. Sun damaged skin with solar lentigines    Recommend sunscreens SPF #30 or greater, protective clothing and avoidance of tanning beds.    2. Benign findings including: cherry angioma    No further intervention required. Patient to report changes.     Patient reassured of the benign nature of these lesions.    3. Multiple clinically benign nevi    No further intervention required. Patient to report changes.     Patient reassured of the benign nature of these lesions.    4. NUB, left lateral forehead. 2 cm x 1.3 cm pink pearly plaque with blue grey ovoid nest of pigmentation and arborizing vessels. Ddx: BCC vs other    Shave biopsy:  After discussion of benefits and risks including but not limited to bleeding/bruising, pain/swelling, infection, scar, incomplete removal, nerve damage/numbness, recurrence, and non-diagnostic biopsy, written consent, verbal consent and photographs were obtained. Time-out was performed. The area was cleaned with isopropyl alcohol. 0.5ml of 1% lidocaine with 1:100,000 epinephrine was injected to obtain adequate anesthesia. A shave biopsy was performed. Hemostasis was achieved with aluminium chloride. Vaseline and a sterile dressing  were applied. The patient tolerated the procedure and no complications were noted. The patient was provided with verbal and written post care instructions.    5. Hidradenitis suppurativa, [place] Stage I. Discussed the pathogenesis of these lesions.     Recommended BPO wash as a body was in the shower.     Prescribed clindamycin 1% lotion to apply as needed following shower.    Discussed only using clinda after washing with BPO to prevent bacterial resistance.    CC Ary Saldivar MD on close of this encounter.  Follow-up in 6 months, earlier for new or changing lesions.       Staff Involved:  Scribe/Staff    Scribe Disclosure  I, Sisi Dubois, am serving as a scribe to document services personally performed by Dr. Jonelle Finney MD, based on data collection and the provider's statements to me.     Provider Disclosure:   The documentation recorded by the scribe accurately reflects the services I personally performed and the decisions made by me.    Jonelle Finney MD    Department of Dermatology  [facility]: Phone: [phone], Fax:[phone]  [facility]: Phone: [phone], Fax: [phone]          ain, thank you for allowing me to participate in the care of your patient.        Sincerely,        Jonelle Finney MD

## 2019-01-28 LAB — COPATH REPORT: NORMAL

## 2019-01-29 ENCOUNTER — TELEPHONE (OUTPATIENT)
Dept: DERMATOLOGY | Facility: CLINIC | Age: 54
End: 2019-01-29

## 2019-01-29 NOTE — TELEPHONE ENCOUNTER
Notes recorded by Carlos Jaime MD on 1/28/2019 at 4:02 PM CST  I agree with Mohs and a consult visit prior to surgery. Thank you.  ------    Notes recorded by Paulina Chi MA on 1/28/2019 at 3:28 PM CST  This cma talked with patient and went through results, patient verbalized understanding. This cma went through mohs check list all negative. Site is a large area. Sending to [place] to review.     MOHS previsit information                                                  Sheri Montoya is a 53 year old female     Patient is being referred  to Dr. Jaime  Referring Provider: Reg   For treatment of: basal cell carcinoma  Site(s): LEft lateral forehead   -if site is groin or below the knee send to RN to initiate antibiotic prophylaxis protocol.  Previous Records received:  NO    Medication & Allergy Information                                                  CURRENT MEDICATIONS:   Current Outpatient Medications:  clindamycin (CLEOCIN T) 1 % external lotion, Apply daily to affected areas after showering., Disp: 60 mL, Rfl: 11  Multiple Vitamins-Minerals (DAILY MULTIVITAMIN PO), Take 1 tablet by mouth daily, Disp: , Rfl:         Do you take the following medications:  Aspirin:  NO  Ibuprofen/Advil/Motrin, Aleve/Naproxen:   NO  Coumadin, Eliquis, Pradaxa, Xarelto:   NO  -If on Coumadin, INR should be checked within 7 days of surgery  Vitamin E:   NO  Plavix:   NO   Angelia's wart: NO   Garlic supplement:  NO   Fish Oil: NO  Antibiotic Prophylaxis:  NO    Do you have an ALLERGIC REACTION to any medications:  NO   Patient has no known allergies.    Past Medical History                                                  Do you currently or have you previously had any of the following conditions:     HIV/AIDS:  NO  Hepatitis:  NO  Suppressed Immune System:  NO  Autoimmune disorder (eg RA, Lupus):  NO  Prolonged bleeding or bleeding disorder:  NO  Fever blisters/herpes, cold sores:  NO  Kidney disease:   NO  Creatinine       Date                     Value               Ref Range           Status                11/21/2018               0.74                0.52 - 1.04 mg*     Final            ----------]  Pacemaker or Defibrillator:  NO.    History of artificial or heart valve replacement:  NO.    Endocarditis: NO  Organ transplant: NO.    Joint replacement within past 2 years: NO  Previous prosthetic joint infection: NO  Diabetes: NO  Pregnant:: NO  Keloids or Abnormal scars: NO  Mobility device (wheelchair, transfer difficulty): NO  Tobacco use:  NO.      If any positives, send to RN to initiate antibiotic prophylaxis protocol and/or anticoagulation management protocol    Reviewed by:  Paulina Chi      ------    Notes recorded by Diana Cifuentes RN on 1/28/2019 at 2:58 PM CST  Pt called, no answer. VM Id's pt. Left message for pt to call the [facility] back at [phone]....Diana Cifuentes RN      ------    Notes recorded by Nina Solomon APRN CNP on 1/28/2019 at 12:53 PM CST  No notes recorded by provider  ------    Notes recorded by Jonelle Finney MD on 1/28/2019 at 12:37 PM CST  Please let patient know biopsy was consistent with BCC (as expected and discussed at appt). This is a large spot, please make sure patient sees Dr. Jaime for Mohs consultation prior to surgery. Next skin check with me in 6 months.    Jonelle Finney MD    Department of Dermatology  [facility]: Phone: [phone], Fax:[phone]  [facility]: Phone: [phone], Fax: [phone]     Dermatological path order and indications   Order: [id number]   Status:  Final result   Visible to patient:  No (Not Released) Dx:  Neoplasm of uncertain behavior   Component 6d ago   Copath Report Patient Name: MARÍA HAINES   MR#: [id number]   Specimen #:    Collected: 1/23/2019   Received:  1/24/2019   Reported: 1/28/2019 11:47   Ordering Phy(s): TEO VELASCO   Additional Phy(s): YOLANDA ALMENDAREZ     For improved result formatting, select 'View Enhanced Report Format' under    Linked Documents section.     SPECIMEN(S):   Skin, left lateral forehead     FINAL DIAGNOSIS:   Skin, left lateral forehead:   - Pigmented basal cell carcinoma, nodular type, extending to the deep   margin - (see description)

## 2019-01-29 NOTE — TELEPHONE ENCOUNTER
I spoke with Sheri and scheduled her for a consult on 1/31/19 and surgery on 2/6/19.  Paola Crouch RN

## 2019-01-31 ENCOUNTER — OFFICE VISIT (OUTPATIENT)
Dept: DERMATOLOGY | Facility: CLINIC | Age: 54
End: 2019-01-31
Payer: COMMERCIAL

## 2019-01-31 DIAGNOSIS — C44.319 BASAL CELL CARCINOMA OF LEFT FOREHEAD: Primary | ICD-10-CM

## 2019-01-31 PROCEDURE — 99212 OFFICE O/P EST SF 10 MIN: CPT | Performed by: DERMATOLOGY

## 2019-01-31 ASSESSMENT — PAIN SCALES - GENERAL: PAINLEVEL: NO PAIN (0)

## 2019-01-31 NOTE — PATIENT INSTRUCTIONS
Mohs Cutaneous Micrographic Surgery Unit  Carlos Jaime DO      Pre-Surgical Instruction Sheet    1. Expect to be here majority of the morning.  The Mohs surgical procedure can be an extensive surgery requiring multiple stages. Each stage may take 1-2 hours.    ? You may eat breakfast  ? You may bring snacks or beverages with you  ? Take all normal medications morning of surgery -- unless otherwise indicated by your physician  ? If you have an artificial heart valve, or joint replacement within two years, we will prescribe an antibiotic. Please take one hour prior to surgery.    2. You will need a  to be with you if your surgical site is close to your eyes. You can get swelling/bruising immediately after surgery and will go home with a big bulky bandage that could obstruct your view of driving safely.    3. Wear comfortable clothing -- preferably a button down shirt or a loose fitted shirt. (to avoid pulling over pressure bandage off when you get home) If your surgery site is on your leg, try wearing loose fitted pants. If your surgery site is on your arm, try wearing a short-sleeve shirt.    4. Bring reading material or other items to help pass time. We do have internet access available if you own a laptop, iPad, etc.)    5. Women - do NOT wear make-up. We will be washing it off anyone needing surgery on the face    6. Do NOT stop blood thinning medication unless instructed to do so by your surgeon. This will include: Warfarin, Coumadin, Jantoven, Aspirin, Plavix and Pradaxa. If you are taking Warfarin or Coumadin, please have your INR blood test results sent to our office no more than 7 days prior to your surgery.     7. Tylenol is a good alternative to take for headaches and pain, and can be taken throughout your surgery and postoperative recovery.    8. If your surgery is on your trunk, arms, hands, legs, feet, fingernail or a toenail, please wash the area with Hibiclens, an over-the-counter antiseptic  soap, the night before and morning of your surgery.     If you have any questions, please feel free to contact us.    Phone numbers:  During business hours (M-F 8:00-4:30 p.m.)  [facility]:  [phone] - select option 1 for appt. desk  [phone] - select option 3 for nurse triage line  [facility]:   [phone] - goes straight to call center   ---------------------------------------------------------  Evenings/Weekends/Holidays  Hospital - [phone]   TTY for hearing mnluvvca-[phone]  *Ask  to page dermatologist on-call  Emergency Dada-[phone]  TTY for hearing impaired- [phone]

## 2019-01-31 NOTE — PROGRESS NOTES
[facility] Dermatology Note      Dermatology Problem List:  1. BCC, left lateral forehead, Mohs planned  2. Hidradenitis suppurativa, stage 1  - recommended BPO wash, clindamycin 1% lotion     Encounter Date: Jan 31, 2019    CC:  Chief Complaint   Patient presents with     Consult For     MOHS left lateral forehead BCC         History of Present Illness:  Ms. 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 The biopsy was done by Dr. Finney on 1/23/19. This is her first known skin cancer.     The patient is otherwise feeling well. There are no other skin concerns at this time.      Past Medical History:   Patient Active Problem List   Diagnosis     CARDIOVASCULAR SCREENING; LDL GOAL LESS THAN 160     Family history of breast cancer     Overweight (BMI 25.0-29.9)     ASCUS with positive high risk HPV cervical     Elevated BP without diagnosis of hypertension     Past Medical History:   Diagnosis Date     ASCUS with positive high risk HPV 7/23/14    + HR HPV 53     Breast cyst      Chicken pox      H/O colposcopy with cervical biopsy 9/2014    Neg for dysplasia     Infertility     s/p 3 rounds of IVF. Was pregnant only very briefly     Obesity      Positive TB test     Has been on INH Janurary 2012     Past Surgical History:   Procedure Laterality Date     COLONOSCOPY N/A 9/18/2017    Procedure: COMBINED COLONOSCOPY, SINGLE OR MULTIPLE BIOPSY/POLYPECTOMY BY BIOPSY;;  Surgeon: Milan Cameron MD;  Location: [facility] OR     COLONOSCOPY WITH CO2 INSUFFLATION N/A 9/18/2017    Procedure: COLONOSCOPY WITH CO2 INSUFFLATION;  Screening for colon cancer  Routine general medical examination at a health care facility  bmi 28.19  Pharmacy: [facility] [place]  ;  Surgeon: Milan Cameron MD;  Location: [facility] OR     CYSTECTOMY PILONIDAL  1996     HC TOOTH EXTRACTION W/FORCEP  2002?      SURGICAL HISTORY OF -       infertility workup-hysteroscope        Social History:  Social History     Socioeconomic History     Marital status: Unknown     Spouse name: Not on file     Number of children: Not on file     Years of education: Not on file     Highest education level: Not on file   Social Needs     Financial resource strain: Not on file     Food insecurity - worry: Not on file     Food insecurity - inability: Not on file     Transportation needs - medical: Not on file     Transportation needs - non-medical: Not on file   Occupational History     Not on file   Tobacco Use     Smoking status: Never Smoker     Smokeless tobacco: Never Used   Substance and Sexual Activity     Alcohol use: Yes     Alcohol/week: 3.5 oz     Types: 7 Standard drinks or equivalent per week     Drug use: No     Sexual activity: Yes     Partners: Male   Other Topics Concern      Service No     Blood Transfusions No     Caffeine Concern No     Occupational Exposure No     Hobby Hazards No     Sleep Concern No     Stress Concern No     Weight Concern No     Special Diet No     Back Care No     Exercise No     Bike Helmet Not Asked     Comment: Pt doesn't ride a bike     Seat Belt Yes     Self-Exams Yes     Parent/sibling w/ CABG, MI or angioplasty before 65F 55M? 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 Paternal Uncle      Asthma No family hx of      Cancer - colorectal No family hx of      Prostate Cancer No family hx of         Medications:  Current Outpatient Medications   Medication Sig Dispense Refill      clindamycin (CLEOCIN T) 1 % external lotion Apply daily to affected areas after showering. 60 mL 11     Multiple Vitamins-Minerals (DAILY MULTIVITAMIN PO) Take 1 tablet by mouth daily         No Known Allergies    Review of Systems:  -Skin Establ Pt: The patient denies any new rash, pruritus, or lesions that are symptomatic, changing or bleeding, except as per HPI.  -Constitutional: The patient is feeling generally well.    Physical exam:  Vitals: There were no vitals taken for this visit.  GEN: This is a well developed, well-nourished female in no acute distress, in a pleasant mood.    SKIN: Focused examination of the forehead was performed.  - 2.4x1.8cm pearly plaque with gray purple macule on the left lateral forehead.   - No other lesions of concern on areas examined.     Impression/Plan:  1. Pigmented basal cell carcinoma, nodular type - left lateral forehead     Reviewed pathology report and nature of diagnosis.     Risks, benefits, and process of Mohs micrographic surgery were discussed including possibility of damage to surrounding anatomical structures and infection. Patient is comfortable proceeding with the surgery; consent form was obtained. She will be scheduled at her convenience.    No indications for pre-op antibiotics.    Pre-op written instructions provided.     Follow-up as scheduled for MMS.       Staff Involved:    Scribe Disclosure  I, Renato Gillette, am serving as a scribe to document services personally performed by Dr. Carlos Jaime DO, based on data collection and the provider's statements to me.     Provider Disclosure:   The documentation recorded by the scribe accurately reflects the services I personally performed and the decisions made by me.    Carlos Jaime DO    Department of Dermatology  [facility]: Phone: [phone], Fax:[phone]  [facility]:  Phone: [phone], Fax: [phone]

## 2019-01-31 NOTE — LETTER
1/31/2019         RE: Sheri Montoya  [address] [address] [facility] [address]        Dear Colleague,    Thank you for referring your patient, Sheri Montoya, to the [facility]. Please see a copy of my visit note below.    [facility] Dermatology Note      Dermatology Problem List:  1. BCC, left lateral forehead, Mohs planned  2. Hidradenitis suppurativa, stage 1  - recommended BPO wash, clindamycin 1% lotion     Encounter Date: Jan 31, 2019    CC:  Chief Complaint   Patient presents with     Consult For     MOHS left lateral forehead BCC         History of Present Illness:  Ms. 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 The biopsy was done by Dr. Finney on 1/23/19. This is her first known skin cancer.     The patient is otherwise feeling well. There are no other skin concerns at this time.      Past Medical History:   Patient Active Problem List   Diagnosis     CARDIOVASCULAR SCREENING; LDL GOAL LESS THAN 160     Family history of breast cancer     Overweight (BMI 25.0-29.9)     ASCUS with positive high risk HPV cervical     Elevated BP without diagnosis of hypertension     Past Medical History:   Diagnosis Date     ASCUS with positive high risk HPV 7/23/14    + HR HPV 53     Breast cyst      Chicken pox      H/O colposcopy with cervical biopsy 9/2014    Neg for dysplasia     Infertility     s/p 3 rounds of IVF. Was pregnant only very briefly     Obesity      Positive TB test     Has been on INH Janurary 2012     Past Surgical History:   Procedure Laterality Date     COLONOSCOPY N/A 9/18/2017    Procedure: COMBINED COLONOSCOPY, SINGLE OR MULTIPLE BIOPSY/POLYPECTOMY BY BIOPSY;;  Surgeon: Milan Cameron MD;  Location: [facility] OR     COLONOSCOPY WITH CO2 INSUFFLATION N/A 9/18/2017    Procedure: COLONOSCOPY WITH CO2 INSUFFLATION;  Screening for  colon cancer  Routine general medical examination at a health care facility  bmi 28.19  Pharmacy: [facility] - [place]  ;  Surgeon: Milan Cameron MD;  Location: [facility] OR     CYSTECTOMY PILONIDAL  1996     HC TOOTH EXTRACTION W/FORCEP  2002?     SURGICAL HISTORY OF -       infertility workup-hysteroscope        Social History:  Social History     Socioeconomic History     Marital status: Unknown     Spouse name: Not on file     Number of children: Not on file     Years of education: Not on file     Highest education level: Not on file   Social Needs     Financial resource strain: Not on file     Food insecurity - worry: Not on file     Food insecurity - inability: Not on file     Transportation needs - medical: Not on file     Transportation needs - non-medical: Not on file   Occupational History     Not on file   Tobacco Use     Smoking status: Never Smoker     Smokeless tobacco: Never Used   Substance and Sexual Activity     Alcohol use: Yes     Alcohol/week: 3.5 oz     Types: 7 Standard drinks or equivalent per week     Drug use: No     Sexual activity: Yes     Partners: Male   Other Topics Concern      Service No     Blood Transfusions No     Caffeine Concern No     Occupational Exposure No     Hobby Hazards No     Sleep Concern No     Stress Concern No     Weight Concern No     Special Diet No     Back Care No     Exercise No     Bike Helmet Not Asked     Comment: Pt doesn't ride a bike     Seat Belt Yes     Self-Exams Yes     Parent/sibling w/ CABG, MI or angioplasty before 65F 55M? 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 Paternal Uncle      Asthma No family hx of      Cancer - colorectal No family hx of      Prostate Cancer No family hx of         Medications:  Current Outpatient Medications   Medication Sig Dispense Refill     clindamycin (CLEOCIN T) 1 % external lotion Apply daily to affected areas after showering. 60 mL 11     Multiple Vitamins-Minerals (DAILY MULTIVITAMIN PO) Take 1 tablet by mouth daily         No Known Allergies    Review of Systems:  -Skin Establ Pt: The patient denies any new rash, pruritus, or lesions that are symptomatic, changing or bleeding, except as per HPI.  -Constitutional: The patient is feeling generally well.    Physical exam:  Vitals: There were no vitals taken for this visit.  GEN: This is a well developed, well-nourished female in no acute distress, in a pleasant mood.    SKIN: Focused examination of the forehead was performed.  - 2.4x1.8cm pearly plaque with gray purple macule on the left lateral forehead.   - No other lesions of concern on areas examined.     Impression/Plan:  1. Pigmented basal cell carcinoma, nodular type - left lateral forehead     Reviewed pathology report and nature of diagnosis.     Risks, benefits, and process of Mohs micrographic surgery were discussed including possibility of damage to surrounding anatomical structures and infection. Patient is comfortable proceeding with the surgery; consent form was obtained. She will be scheduled at her convenience.    No indications for pre-op antibiotics.    Pre-op written instructions provided.     Follow-up as scheduled for MMS.       Staff Involved:    Scribe Disclosure  I, Renato Gillette, am serving as a scribe to document services personally performed by Dr. Carlos Jaime DO, based on data collection and the provider's statements to me.     Provider Disclosure:   The documentation recorded by the scribe accurately reflects the services I personally performed and the decisions made  by me.    Carlos Jaime DO    Department of Dermatology  [facility]: Phone: [phone], Fax:[phone]  [facility] [place]: Phone: [phone], Fax: [phone]    Again, thank you for allowing me to participate in the care of your patient.        Sincerely,        Carlos Jaime MD

## 2019-01-31 NOTE — NURSING NOTE
Sheri Montoya's goals for this visit include:   Chief Complaint   Patient presents with     Consult For     MOHS left lateral forehead BCC       She requests these members of her care team be copied on today's visit information:     PCP: Ary Saldivar    Referring Provider:  Jonelle Finney MD  [address] [place] [id number]    There were no vitals taken for this visit.    Do you need any medication refills at today's visit? Irina Silva LPN

## 2019-02-06 ENCOUNTER — OFFICE VISIT (OUTPATIENT)
Dept: DERMATOLOGY | Facility: CLINIC | Age: 54
End: 2019-02-06
Payer: COMMERCIAL

## 2019-02-06 VITALS — SYSTOLIC BLOOD PRESSURE: 142 MMHG | OXYGEN SATURATION: 100 % | HEART RATE: 83 BPM | DIASTOLIC BLOOD PRESSURE: 89 MMHG

## 2019-02-06 DIAGNOSIS — C44.319 BASAL CELL CARCINOMA OF LEFT FOREHEAD: Primary | ICD-10-CM

## 2019-02-06 PROCEDURE — 17311 MOHS 1 STAGE H/N/HF/G: CPT | Performed by: DERMATOLOGY

## 2019-02-06 PROCEDURE — 13132 CMPLX RPR F/C/C/M/N/AX/G/H/F: CPT | Mod: 51 | Performed by: DERMATOLOGY

## 2019-02-06 RX ORDER — MUPIROCIN 20 MG/G
OINTMENT TOPICAL
Qty: 22 G | Refills: 0 | Status: SHIPPED | OUTPATIENT
Start: 2019-02-06 | End: 2019-07-30

## 2019-02-06 ASSESSMENT — PAIN SCALES - GENERAL: PAINLEVEL: NO PAIN (0)

## 2019-02-06 NOTE — LETTER
2/6/2019         RE: Sheri Montoya  [address] [facility] [address] [facility] [address]        Dear Colleague,    Thank you for referring your patient, Sheri Montoya, to the [facility]. Please see a copy of my visit note below.    [facility] Mohs Dermatologic Surgery Procedure Note    Date of Service:  Feb 6, 2019  Surgery: Mohs micrographic surgery    Case 1  Tumor Type: BCC - Basal cell carcinoma  Location: left lateral forehead  Derm-Path Accession #:   PreOp Size: 2.5x1.8 cm  PostOp Size: 3.1x2.8 cm  Mohs Accession #: [id number]  Level of Defect: fat  Repair Type: complex  Repair Size: 5.0 cm  Suture Material: Fast Absorbing Gut 5-0      Procedure:  We discussed the principles of treatment and most likely complications including scarring, bleeding, infection, swelling, pain, crusting, nerve damage, large wound,  incomplete excision, wound dehiscence,  nerve damage, recurrence, and a second procedure may be recommended to obtain the best cosmetic or functional result.    Informed consent was obtained and the patient underwent the procedure as follows:  The patient was placed supine on the operating table.  The cancer was identified, outlined with a marker, and verified by the patient.  The entire surgical field was prepped with Hibiclens.  The surgical site was anesthetized using Lidocaine 1% with epi 1:100,000.      The area of clinically apparent tumor was debulked. The layer of tissue was then surgically excised using a #15 blade and was then transferred onto a specimen sheet maintaining the orientation of the specimen. Hemostasis was obtained using bipolar electrocoagulation. The wound site was then covered with a dressing while the tissue samples were processed for examination.    The excised tissue was [facility] maintaining the tissue orientation.  The tissue specimen was relaxed so that the entire surgical margin was in a a single  horizontal plane for sectioning and inked for precise mapping.  A precise reference map was drawn to reflect the sectioning of the specimen, colored inking of the margins, and orientation on the patient. The tissue was processed using horizontal sectioning of the base and continuous peripheral margins.  The histopathologic sections were reviewed in conjunction with the reference map.    Total blocks: 2    Total slides:  8    There were no cancer cells visualized on examination, therefore Mohs surgery was complete.      RECONSTRUCTION: COMPLEX CLOSURE    Primary Surgeon: Dr. Jaime   Assistant Surgeon:     Case(s) Specific Information:    CASE 1    The patient was taken to the operative suite and placed in a supine position on the operating room table. The defect was identified.  Appropriate markings were made with a marking pen to plan the repair.  The area was infiltrated with Lidocaine 1% with epi 1:100,000 5ml and prepped with Hibiclens and draped with sterile towels.     The wound was debeveled and undermined widely. Cones were excised with relaxed skin tension lines on both sides of the defect. Hemostasis was obtained using bipolar electrocoagulation. The wound was narrowed with SMAS plication  and the dermis and subcutaneous tissue were then approximated using buried vertical mattress sutures.  The wound edges were then approximated and additional buried sutures were placed in a similar fashion where needed.  Percutaneous simple running sutures were carefully placed for maximum eversion and meticulous approximation.    Repair Size: 5.0cm  Sutures Used:  Deep: vicryl 4-0 Superficial: Fast Absorbing Gut 5-0   Anesthesia Visit Total (ml): 9 ml    The wound was cleansed with saline and ointment was applied along the wound surface.     A sterile pressure dressing was applied.  Wound care instructions were given verbally and in writing. The patient left the operating suite in stable condition.  Patient was informed  that additional refinement of the resulting surgical scar may be used as a second stage of this reconstruction.     The Attending surgeon was present for the entire procedure and always immediately available.      Carlos Jaime DO             Test Performed at:   Dept. of Dermatology   [facility]    [address] [place] [address]          [phone]                               Again, thank you for allowing me to participate in the care of your patient.        Sincerely,        Carlos Jaime MD

## 2019-02-06 NOTE — NURSING NOTE
The following medication was given:     MEDICATION:  Lidocaine with epinephrine 1% 1:100972  ROUTE: SQ  SITE: see procedure note  DOSE: 9 ml   LOT #: -[id number]  : Hospira  EXPIRATION DATE:12/1/19  NDC#: 4092-0391-22   Was there drug waste? No  Multi-dose vial: Yes    Paulina Chi MA  February 6, 2019    Mupirocin and pressure dressing applied to Mohs site on Left Lateral forehead.  Wound care instructions reviewed with patient and AVS provided.  Patient verbalized understanding.  Patient will follow up for 2 week follow up.  No further questions or concerns at this time.    Paulina Chi CMA

## 2019-02-06 NOTE — PATIENT INSTRUCTIONS
MOHS Wound Care Instructions  I will experience scar, altered skin color, bleeding, swelling, pain, crusting and redness. I may experience altered sensation. Risks are excessive bleeding, infection, muscle weakness, thick (hypertrophic or keloidal) scar, and recurrence,. A second procedure may be recommended to obtain the best cosmetic or functional result.  Possible complications of any surgical procedure are bleeding, infection, scarring, alteration in skin color and sensation, muscle weakness in the area, wound dehiscence or seperation, or recurrence of the lesion or disease. On occasion, after healing, a secondary procedure or revision may be recommended in order to obtain the best cosmetic or functional result.   After your surgery, a pressure bandage will be placed over the area that has sutures. This will help prevent bleeding. Please follow these instructions as they will help you to prevent complications as your wound heals.  For the First 48 hours After Surgery:  1. Leave the pressure bandage on and keep it dry. If it should come loose, you may retape it, but do not take it off.  2. Relax and take it easy. Do not do any vigorous exercise, heavy lifting, or bending forward. This could cause the wound to bleed.  3. Post-operative pain is usually mild. You may take plain or extra strength Tylenol every 4 hours as needed (do not take more than 4,000mg in one day). Do not take any medicine that contains aspirin, ibuprofen or motrin unless you have been recommended these by a doctor.  Avoid alcohol and vitamin E as these may increase your tendency to bleed.  4. You may put an ice pack around the bandaged area for 20 minutes every 2-3 hours. This may help reduce swelling, bruising, and pain. Make sure the ice pack is waterproof so that the pressure bandage does not get wet.   5. You may see a small amount of drainage or blood on your pressure bandage. This is normal. However, if drainage or bleeding continues or  saturates the bandage, you will need to apply firm pressure over the bandage with a washcloth for 15 minutes. If bleeding continues after applying pressure for 15 minutes then go to the nearest emergency room.  48 Hours After Surgery  Carefully remove the bandage and start daily wound care and dressing changes. You may also now shower and get the wound wet. Wash wound with a mild soap and water.  Use caution when washing the wound. Be gentle and do not let the forceful shower stream hit the wound directly.  PAT dry.  Daily Wound Care:  1. Wash wound with a mild soap and water.  Use caution when washing the wound, be gentle and do not let the forceful shower stream hit the wound directly.  2. PAT DRY.  3. Apply Mupirocin over the suture line with a Q-tip. It is very important to keep the wound continuously moist, as wounds heal best in a moist environment.  4.  Keep the site covered until sutures are removed, you can cover it with a Telfa (non-stick) dressing and tape or a band-aid.    5. If you are unable to keep wound covered, you must apply Vaseline every 2 - 3 hours (while awake) to ensure it is being kept moist for optimal healing. A dressing overnight is recommended to keep the area moist.   Call Us If:  1. You have pain that is not controlled with Tylenol.  2. You have signs or symptoms of an infection, such as: fever over 100 degrees F, redness, warmth, or foul-smelling or yellow/creamy drainage from the wound.  Who should I call with questions?    [facility]: [phone]     [facility]: [phone]    For urgent needs outside of business hours call the [facility] at [phone] and ask for the dermatology resident on call

## 2019-02-06 NOTE — NURSING NOTE
Sheri Montoya's goals for this visit include:   Chief Complaint   Patient presents with     Procedure     MOHS left lateral forehead BCC       She requests these members of her care team be copied on today's visit information:     PCP: Ary Saldivar    Referring Provider:  Jonelle Finney MD  [address] [place] [address]    BP (!) 155/108 (BP Location: Left arm, Patient Position: Sitting, Cuff Size: Adult Regular)   Pulse 83   SpO2 100%     Do you need any medication refills at today's visit? Irina Silva LPN

## 2019-02-07 NOTE — PROGRESS NOTES
[facility] Dermatologic Surgery Procedure Note    Date of Service:  Feb 6, 2019  Surgery: Mohs micrographic surgery    Case 1  Tumor Type: BCC - Basal cell carcinoma  Location: left lateral forehead  Derm-Path Accession #:   PreOp Size: 2.5x1.8 cm  PostOp Size: 3.1x2.8 cm  Mohs Accession #: [id number]  Level of Defect: fat  Repair Type: complex  Repair Size: 5.0 cm  Suture Material: Fast Absorbing Gut 5-0      Procedure:  We discussed the principles of treatment and most likely complications including scarring, bleeding, infection, swelling, pain, crusting, nerve damage, large wound,  incomplete excision, wound dehiscence,  nerve damage, recurrence, and a second procedure may be recommended to obtain the best cosmetic or functional result.    Informed consent was obtained and the patient underwent the procedure as follows:  The patient was placed supine on the operating table.  The cancer was identified, outlined with a marker, and verified by the patient.  The entire surgical field was prepped with Hibiclens.  The surgical site was anesthetized using Lidocaine 1% with epi 1:100,000.      The area of clinically apparent tumor was debulked. The layer of tissue was then surgically excised using a #15 blade and was then transferred onto a specimen sheet maintaining the orientation of the specimen. Hemostasis was obtained using bipolar electrocoagulation. The wound site was then covered with a dressing while the tissue samples were processed for examination.    The excised tissue was transported to the Mohs histology laboratory maintaining the tissue orientation.  The tissue specimen was relaxed so that the entire surgical margin was in a a single horizontal plane for sectioning and inked for precise mapping.  A precise reference map was drawn to reflect the sectioning of the specimen, colored inking of the margins, and orientation on the patient. The tissue was processed using horizontal  sectioning of the base and continuous peripheral margins.  The histopathologic sections were reviewed in conjunction with the reference map.    Total blocks: 2    Total slides:  8    There were no cancer cells visualized on examination, therefore Mohs surgery was complete.      RECONSTRUCTION: COMPLEX CLOSURE    Primary Surgeon: Dr. Jaime   Assistant Surgeon:     Case(s) Specific Information:    CASE 1    The patient was taken to the operative suite and placed in a supine position on the operating room table. The defect was identified.  Appropriate markings were made with a marking pen to plan the repair.  The area was infiltrated with Lidocaine 1% with epi 1:100,000 5ml and prepped with Hibiclens and draped with sterile towels.     The wound was debeveled and undermined widely. Cones were excised with relaxed skin tension lines on both sides of the defect. Hemostasis was obtained using bipolar electrocoagulation. The wound was narrowed with SMAS plication  and the dermis and subcutaneous tissue were then approximated using buried vertical mattress sutures.  The wound edges were then approximated and additional buried sutures were placed in a similar fashion where needed.  Percutaneous simple running sutures were carefully placed for maximum eversion and meticulous approximation.    Repair Size: 5.0cm  Sutures Used:  Deep: vicryl 4-0 Superficial: Fast Absorbing Gut 5-0   Anesthesia Visit Total (ml): 9 ml    The wound was cleansed with saline and ointment was applied along the wound surface.     A sterile pressure dressing was applied.  Wound care instructions were given verbally and in writing. The patient left the operating suite in stable condition.  Patient was informed that additional refinement of the resulting surgical scar may be used as a second stage of this reconstruction.     The Attending surgeon was present for the entire procedure and always immediately available.      Carlos Jaime,              Test  Performed at:   Dept. of Dermatology   [facility]    [facility]    [address] [address] [place] [address]          [phone]

## 2019-02-20 ENCOUNTER — OFFICE VISIT (OUTPATIENT)
Dept: DERMATOLOGY | Facility: CLINIC | Age: 54
End: 2019-02-20
Payer: COMMERCIAL

## 2019-02-20 DIAGNOSIS — C44.319 BASAL CELL CARCINOMA OF LEFT TEMPLE REGION: Primary | ICD-10-CM

## 2019-02-20 PROCEDURE — 99024 POSTOP FOLLOW-UP VISIT: CPT | Performed by: DERMATOLOGY

## 2019-02-20 ASSESSMENT — PAIN SCALES - GENERAL: PAINLEVEL: NO PAIN (0)

## 2019-02-20 NOTE — NURSING NOTE
Sheri Montoya's goals for this visit include:   Chief Complaint   Patient presents with     Wound Check     left lateral forehead sp MOHS 2-6-19       She requests these members of her care team be copied on today's visit information:     PCP: Ary Saldivar    Referring Provider:  No referring provider defined for this encounter.    There were no vitals taken for this visit.    Do you need any medication refills at today's visit? Irina Silva LPN

## 2019-02-21 NOTE — PROGRESS NOTES
[facility] Dermatology Post-operative Visit note:  Subjective: The patient follows up for a wound check after undergoing Mohs surgery to remove basal cell carcinoma from the left lateral forehaed on 2/6/19. The resulting defect was repaired with a complex linear closure. The patient says the site is doing well and denies any bleeding, purulence, or excess pain/tenderness. She has been doing wound care as instructed. She has not experienced any problems with healing. The patient is otherwise feeling well. There are no other skin concerns at this time.  Objective: On exam, there is an appropriately healing surgical site on the left lateral forehead with no purulence, tenderness or surrounding erythema. Ends of the scar are well healed. Erosion and hemorrhagic crust centrally.   Assessment and Plan: Appropriately healing surgical site without any signs of infection    Instructed to continue daily dressing changes and application of petroleum jelly until healed over with new pink skin and all sutures are dissolved.     Return in 3 months for scar evaluation, at which time we can consider treatments such as dermabrasion to minimize scar's appearance if desired.    Recommended sunscreens SPF #50 or greater and protective clothing. Risk of scar dyspigmentation discussed.     Continue periodic self skin exams and report of any new or changing lesions.     Please refer to previous clinic note for details regarding treatment.  Follow up in 3 months for scar evaluation.     Staff:    Scribe Disclosure  I, Renato Gillette am serving as a scribe to document services personally performed by Dr. Carlos Jaime DO, based on data collection and the provider's statements to me.     Provider Disclosure:   The documentation recorded by the scribe accurately reflects the services I personally performed and the decisions made by me.    Carlos Jaime DO    Department of Dermatology  [facility]  [facility]: Phone: [phone], Fax:[phone]  [facility]: Phone: [phone], Fax: [phone]

## 2019-05-15 ENCOUNTER — OFFICE VISIT (OUTPATIENT)
Dept: DERMATOLOGY | Facility: CLINIC | Age: 54
End: 2019-05-15
Payer: COMMERCIAL

## 2019-05-15 DIAGNOSIS — C44.319 BASAL CELL CARCINOMA OF LEFT FOREHEAD: Primary | ICD-10-CM

## 2019-05-15 PROCEDURE — 99024 POSTOP FOLLOW-UP VISIT: CPT | Performed by: DERMATOLOGY

## 2019-05-15 ASSESSMENT — PAIN SCALES - GENERAL: PAINLEVEL: NO PAIN (0)

## 2019-05-15 NOTE — LETTER
5/15/2019         RE: Sheri Montoya  [address] [address] [place] [address]        Dear Colleague,    Thank you for referring your patient, Sheri Montoya, to the [facility]. Please see a copy of my visit note below.    [facility] Dermatology Post-operative Visit note:  Subjective: The patient follows up for a scar evaluation after undergoing Mohs surgery to remove basal cell carcinoma from the left lateral forehead on 2/6/19. The resulting defect was repaired with a complex linear closure. Today the patient states that the scar healed up well. She did not have any concerns regarding healing. She says it sometimes feels tingly but this seems to be decreasing over time. She is not bothered by this enough to seek further treatment. She has been using ScarAway which she thinks help keep it moist. The patient is otherwise feeling well. There are no other skin concerns at this time.  Objective: Focused examination of the left lateral forehead was performed.  - No erythema, telangectasias, nodularity, or pigmentation on the left lateral forehead.   Assessment and Plan: Appropriately healing surgical site without any signs of infection    Provided options for scar revision including ILK or dermabrasion. Patient declines for now and understands she can schedule at her convenience if desired.     Recommended sunscreens SPF #50 or greater and protective clothing. Risk of scar dyspigmentation discussed.     Continue periodic self skin exams and report of any new or changing lesions.     Please refer to previous clinic note for details regarding treatment.  Follow up in 2 months for skin cancer screening.     Staff:    Scribe Disclosure  I, Renato Gillette, am serving as a scribe to document services personally performed by Dr. Carlos Jaime DO, based on data collection and the provider's statements to me.     Provider Disclosure:   The documentation recorded by the scribe  accurately reflects the services I personally performed and the decisions made by me.    Carlos Jaime DO    Department of Dermatology  [facility]: Phone: [phone], Fax:[phone]  [facility]: Phone: [phone], Fax: [phone]      Again, thank you for allowing me to participate in the care of your patient.        Sincerely,        Carlos Jaime MD

## 2019-05-15 NOTE — PROGRESS NOTES
[facility] Dermatology Post-operative Visit note:  Subjective: The patient follows up for a scar evaluation after undergoing Mohs surgery to remove basal cell carcinoma from the left lateral forehead on 2/6/19. The resulting defect was repaired with a complex linear closure. Today the patient states that the scar healed up well. She did not have any concerns regarding healing. She says it sometimes feels tingly but this seems to be decreasing over time. She is not bothered by this enough to seek further treatment. She has been using ScarAway which she thinks help keep it moist. The patient is otherwise feeling well. There are no other skin concerns at this time.  Objective: Focused examination of the left lateral forehead was performed.  - No erythema, telangectasias, nodularity, or pigmentation on the left lateral forehead.   Assessment and Plan: Appropriately healing surgical site without any signs of infection    Provided options for scar revision including ILK or dermabrasion. Patient declines for now and understands she can schedule at her convenience if desired.     Recommended sunscreens SPF #50 or greater and protective clothing. Risk of scar dyspigmentation discussed.     Continue periodic self skin exams and report of any new or changing lesions.     Please refer to previous clinic note for details regarding treatment.  Follow up in 2 months for skin cancer screening.     Staff:    Scribe Disclosure  I, Renato Gillette, am serving as a scribe to document services personally performed by Dr. Carlos Jaime DO, based on data collection and the provider's statements to me.     Provider Disclosure:   The documentation recorded by the scribe accurately reflects the services I personally performed and the decisions made by me.    Carlos Jaime DO    Department of Dermatology  [facility]: Phone: [phone],  Fax:[phone]  [facility]: Phone: [phone], Fax: [phone]

## 2019-05-15 NOTE — NURSING NOTE
Sheri Montoya's goals for this visit include:   Chief Complaint   Patient presents with     Scar Management     left lateral forehead sp mohs 2-6        She requests these members of her care team be copied on today's visit information:     PCP: Ary Saldivar    Referring Provider:  No referring provider defined for this encounter.    There were no vitals taken for this visit.    Do you need any medication refills at today's visit? Irina Silva LPN

## 2019-07-30 ENCOUNTER — OFFICE VISIT (OUTPATIENT)
Dept: DERMATOLOGY | Facility: CLINIC | Age: 54
End: 2019-07-30
Payer: COMMERCIAL

## 2019-07-30 DIAGNOSIS — D23.9 DILATED PORE OF WINER: ICD-10-CM

## 2019-07-30 DIAGNOSIS — D18.01 CHERRY ANGIOMA: ICD-10-CM

## 2019-07-30 DIAGNOSIS — L81.4 SOLAR LENTIGO: ICD-10-CM

## 2019-07-30 DIAGNOSIS — Z85.828 HISTORY OF NONMELANOMA SKIN CANCER: Primary | ICD-10-CM

## 2019-07-30 DIAGNOSIS — D22.9 MULTIPLE BENIGN NEVI: ICD-10-CM

## 2019-07-30 DIAGNOSIS — L73.2 HIDRADENITIS SUPPURATIVA: ICD-10-CM

## 2019-07-30 DIAGNOSIS — L57.8 SUN-DAMAGED SKIN: ICD-10-CM

## 2019-07-30 PROCEDURE — 99214 OFFICE O/P EST MOD 30 MIN: CPT | Performed by: DERMATOLOGY

## 2019-07-30 NOTE — LETTER
7/30/2019         RE: Sheri Montoya  [address] [address] [address]        Dear Colleague,    Thank you for referring your patient, Sheri Montoya, to the [facility]. Please see a copy of my visit note below.    [facility] Dermatology Note      Dermatology Problem List:  1. BCC, left lateral forehead - s/p biopsy 1/23/2019, s/p Mohs 2/6/19  2. Hidradenitis suppurativa, stage 1: recommended BPO wash, clindamycin 1% lotion    Encounter Date: Jul 30, 2019    CC:  Chief Complaint   Patient presents with     Skin Check     Hx of NMSC - Mohs L Left forehead - 2-2019       History of Present Illness:  Ms. Sheri Montoya is a 54 year old female who presents as a follow-up for a skin check. She was previously seen on 1/23/19 when a BCC was found on her left lateral forehead. Today, she is concerned that the BCC may be recurring to some degree in this same area on her left lateral forehead. She notices one edge of the scar is raised. She denies any other areas concerning for NMSC. No tender, nonhealing skin lesions.     She recently returned from a kayaking trip in [place].     She only uses BPO wash and clindamycin lotion on during flare ups for lesions in the groin. She does not use daily. She notes great improvement.     No other concerns addressed today.       Past Medical History:   Patient Active Problem List   Diagnosis     CARDIOVASCULAR SCREENING; LDL GOAL LESS THAN 160     Family history of breast cancer     Overweight (BMI 25.0-29.9)     ASCUS with positive high risk HPV cervical     Elevated BP without diagnosis of hypertension     Past Medical History:   Diagnosis Date     ASCUS with positive high risk HPV 7/23/14    + HR HPV 52     Breast cyst      Chicken pox      H/O colposcopy with cervical biopsy 9/2014    Neg for dysplasia     Infertility     s/p 3 rounds of IVF. Was pregnant only very briefly     Obesity      Positive TB test     Has been on INH  [place] 2012     Past Surgical History:   Procedure Laterality Date     COLONOSCOPY N/A 9/18/2017    Procedure: COMBINED COLONOSCOPY, SINGLE OR MULTIPLE BIOPSY/POLYPECTOMY BY BIOPSY;;  Surgeon: Milan Cameron MD;  Location: [facility] OR     COLONOSCOPY WITH CO2 INSUFFLATION N/A 9/18/2017    Procedure: COLONOSCOPY WITH CO2 INSUFFLATION;  Screening for colon cancer  Routine general medical examination at a health care facility  bmi 28.19  Pharmacy: [facility] [place]  ;  Surgeon: Milan Cameron MD;  Location: [facility] OR     CYSTECTOMY PILONIDAL  1996     HC TOOTH EXTRACTION W/FORCEP  2002?     SURGICAL HISTORY OF -       infertility workup-hysteroscope        Social History:  Patient reports that she has never smoked. She has never used smokeless tobacco. She reports that she drinks about 3.5 oz of alcohol per week. She reports that she does not use drugs. Patient works as . Patient has history of tanning bed usage, only once for an event in 1989. She is  with two adopted children.   Reviewed and left in chart for clinician convenience.       Family History:  No family history of skin cancer  Reviewed and left in chart for clinician convenience.       Medications:  Current Outpatient Medications   Medication Sig Dispense Refill     clindamycin (CLEOCIN T) 1 % external lotion Apply daily to affected areas after showering. 60 mL 11     Multiple Vitamins-Minerals (DAILY MULTIVITAMIN PO) Take 1 tablet by mouth daily         No Known Allergies    Review of Systems:  -Constitutional: Patient is otherwise feeling well, in usual state of health.   -Skin: As above in HPI. No additional skin concerns.    Physical exam:  Vitals: There were no vitals taken for this visit.  GEN: This is a well developed, well-nourished female in no acute distress, in a pleasant mood.    SKIN: Total skin excluding the undergarment areas was performed. The exam included the head/face, neck, both arms, chest, back, abdomen, both  legs, digits and/or nails and buttocks.  - Arias Type II  - Scattered brown macules on sun exposed areas.  - Tan exposed skin   - Left lateral mid back, dilated pore of jess   - Well healed linear scar on the left temple to left latteral temple. Adjacent there is a pink lobular papule consistent with a vascular lesion. This was present in previous biopsy photograph. No pearly appearance.   -There are dome shaped bright red papules on the trunk.   - Multiple regular brown pigmented macules and papules are identified on the body.   - Hyperpigmentation on medial thighs, comedone like openings, 1 scarred sinus track on left med thigh, 1 inflammatory papule on left medial thigh  -No other lesions of concern on areas examined.       Impression/Plan:  1. History of NMSC. No evidence of recurrence.    Recommend sunscreens SPF #30 or greater, protective clothing and avoidance of tanning beds.    2. Sun damaged skin with solar lentigines    Recommend sunscreens SPF #30 or greater, protective clothing and avoidance of tanning beds.    3. Benign findings including: cherry angioma, dilated pore of jess    No further intervention required. Patient to report changes.     Patient reassured of the benign nature of these lesions.    4. Multiple clinically benign nevi    No further intervention required. Patient to report changes.     Patient reassured of the benign nature of these lesions.    5. Hidradenitis suppurativa, miold. Discussed the pathogenesis of these lesions.     Recommended patient continue BPO wash as a body was in the shower daily.    Continue clindamycin 1% lotion during flare-ups following shower.      Follow-up in 6 months, earlier for new or changing lesions.       Staff Involved:  Scribe/Staff    Scribe Disclosure  I, Светлана Carrion, am serving as a scribe to document services personally performed by Dr. Jonelle Finney MD, based on data collection and the provider's statements to me.     Provider  Disclosure:   The documentation recorded by the scribe accurately reflects the services I personally performed and the decisions made by me.    Jonelle Finney MD    Department of Dermatology  [facility]: Phone: [phone], Fax:[phone]  [facility]: Phone: [phone], Fax: [phone]              Again, thank you for allowing me to participate in the care of your patient.        Sincerely,        Jonelle Finney MD

## 2019-07-30 NOTE — PROGRESS NOTES
[facility] Dermatology Note      Dermatology Problem List:  1. BCC, left lateral forehead - s/p biopsy 1/23/2019, s/p Mohs 2/6/19  2. Hidradenitis suppurativa, stage 1: recommended BPO wash, clindamycin 1% lotion    Encounter Date: Jul 30, 2019    CC:  Chief Complaint   Patient presents with     Skin Check     Hx of NMSC - Mohs L Left forehead - 2-2019       History of Present Illness:  Ms. Sheri Montoya is a 54 year old female who presents as a follow-up for a skin check. She was previously seen on 1/23/19 when a BCC was found on her left lateral forehead. Today, she is concerned that the BCC may be recurring to some degree in this same area on her left lateral forehead. She notices one edge of the scar is raised. She denies any other areas concerning for NMSC. No tender, nonhealing skin lesions.     She recently returned from a kayaking trip in [place].     She only uses BPO wash and clindamycin lotion on during flare ups for lesions in the groin. She does not use daily. She notes great improvement.     No other concerns addressed today.       Past Medical History:   Patient Active Problem List   Diagnosis     CARDIOVASCULAR SCREENING; LDL GOAL LESS THAN 160     Family history of breast cancer     Overweight (BMI 25.0-29.9)     ASCUS with positive high risk HPV cervical     Elevated BP without diagnosis of hypertension     Past Medical History:   Diagnosis Date     ASCUS with positive high risk HPV 7/23/14    + HR HPV 52     Breast cyst      Chicken pox      H/O colposcopy with cervical biopsy 9/2014    Neg for dysplasia     Infertility     s/p 3 rounds of IVF. Was pregnant only very briefly     Obesity      Positive TB test     Has been on INH Janurary 2012     Past Surgical History:   Procedure Laterality Date     COLONOSCOPY N/A 9/18/2017    Procedure: COMBINED COLONOSCOPY, SINGLE OR MULTIPLE BIOPSY/POLYPECTOMY BY BIOPSY;;  Surgeon: Milan Cameron MD;  Location: [facility] OR     COLONOSCOPY WITH  CO2 INSUFFLATION N/A 9/18/2017    Procedure: COLONOSCOPY WITH CO2 INSUFFLATION;  Screening for colon cancer  Routine general medical examination at a health care facility  bmi 28.19  Pharmacy: [facility] - [place]  ;  Surgeon: Milan Cameron MD;  Location: [facility] OR     CYSTECTOMY PILONIDAL  1996     HC TOOTH EXTRACTION W/FORCEP  2002?     SURGICAL HISTORY OF -       infertility workup-hysteroscope        Social History:  Patient reports that she has never smoked. She has never used smokeless tobacco. She reports that she drinks about 3.5 oz of alcohol per week. She reports that she does not use drugs. Patient works as . Patient has history of tanning bed usage, only once for an event in 1989. She is  with two adopted children.   Reviewed and left in chart for clinician convenience.       Family History:  No family history of skin cancer  Reviewed and left in chart for clinician convenience.       Medications:  Current Outpatient Medications   Medication Sig Dispense Refill     clindamycin (CLEOCIN T) 1 % external lotion Apply daily to affected areas after showering. 60 mL 11     Multiple Vitamins-Minerals (DAILY MULTIVITAMIN PO) Take 1 tablet by mouth daily         No Known Allergies    Review of Systems:  -Constitutional: Patient is otherwise feeling well, in usual state of health.   -Skin: As above in HPI. No additional skin concerns.    Physical exam:  Vitals: There were no vitals taken for this visit.  GEN: This is a well developed, well-nourished female in no acute distress, in a pleasant mood.    SKIN: Total skin excluding the undergarment areas was performed. The exam included the head/face, neck, both arms, chest, back, abdomen, both legs, digits and/or nails and buttocks.  - Arias Type II  - Scattered brown macules on sun exposed areas.  - Tan exposed skin   - Left lateral mid back, dilated pore of jess   - Well healed linear scar on the left temple to left latteral temple.  Adjacent there is a pink lobular papule consistent with a vascular lesion. This was present in previous biopsy photograph. No pearly appearance.   -There are dome shaped bright red papules on the trunk.   - Multiple regular brown pigmented macules and papules are identified on the body.   - Hyperpigmentation on medial thighs, comedone like openings, 1 scarred sinus track on left med thigh, 1 inflammatory papule on left medial thigh  -No other lesions of concern on areas examined.       Impression/Plan:  1. History of NMSC. No evidence of recurrence.    Recommend sunscreens SPF #30 or greater, protective clothing and avoidance of tanning beds.    2. Sun damaged skin with solar lentigines    Recommend sunscreens SPF #30 or greater, protective clothing and avoidance of tanning beds.    3. Benign findings including: cherry angioma, dilated pore of jess    No further intervention required. Patient to report changes.     Patient reassured of the benign nature of these lesions.    4. Multiple clinically benign nevi    No further intervention required. Patient to report changes.     Patient reassured of the benign nature of these lesions.    5. Hidradenitis suppurativa, miold. Discussed the pathogenesis of these lesions.     Recommended patient continue BPO wash as a body was in the shower daily.    Continue clindamycin 1% lotion during flare-ups following shower.      Follow-up in 6 months, earlier for new or changing lesions.       Staff Involved:  Scribe/Staff    Scribe Disclosure  IСветлана, am serving as a scribe to document services personally performed by Dr. Jonelle Finney MD, based on data collection and the provider's statements to me.     Provider Disclosure:   The documentation recorded by the scribe accurately reflects the services I personally performed and the decisions made by me.    Jonelle Finney MD    Department of Dermatology  [facility]  [facility]: Phone: [phone], Fax:[phone]  [facility]: Phone: [phone], Fax: [phone]

## 2019-07-30 NOTE — NURSING NOTE
@Sheri Montoya's goals for this visit include:   Chief Complaint   Patient presents with     Skin Check     Hx of NMSC - Mohs L Left forehead - 2-2019       She requests these members of her care team be copied on today's visit information: NO    PCP: Ary Saldivar    Referring Provider:  No referring provider defined for this encounter.    There were no vitals taken for this visit.    Do you need any medication refills at today's visit? NO    Emy Lassiter, [facility]

## 2019-11-06 ENCOUNTER — HEALTH MAINTENANCE LETTER (OUTPATIENT)
Age: 54
End: 2019-11-06

## 2019-12-04 ENCOUNTER — OFFICE VISIT (OUTPATIENT)
Dept: PEDIATRICS | Facility: CLINIC | Age: 54
End: 2019-12-04
Payer: COMMERCIAL

## 2019-12-04 ENCOUNTER — ANCILLARY PROCEDURE (OUTPATIENT)
Dept: MAMMOGRAPHY | Facility: CLINIC | Age: 54
End: 2019-12-04
Attending: FAMILY MEDICINE
Payer: COMMERCIAL

## 2019-12-04 VITALS
BODY MASS INDEX: 29.17 KG/M2 | TEMPERATURE: 97.4 F | OXYGEN SATURATION: 99 % | HEART RATE: 80 BPM | WEIGHT: 175.1 LBS | HEIGHT: 65 IN | DIASTOLIC BLOOD PRESSURE: 92 MMHG | SYSTOLIC BLOOD PRESSURE: 149 MMHG

## 2019-12-04 DIAGNOSIS — Z12.4 CERVICAL CANCER SCREENING: ICD-10-CM

## 2019-12-04 DIAGNOSIS — Z80.3 FAMILY HISTORY OF BREAST CANCER: ICD-10-CM

## 2019-12-04 DIAGNOSIS — Z13.0 SCREENING FOR DEFICIENCY ANEMIA: ICD-10-CM

## 2019-12-04 DIAGNOSIS — Z13.29 SCREENING FOR THYROID DISORDER: ICD-10-CM

## 2019-12-04 DIAGNOSIS — Z12.31 VISIT FOR SCREENING MAMMOGRAM: ICD-10-CM

## 2019-12-04 DIAGNOSIS — Z23 NEED FOR SHINGLES VACCINE: ICD-10-CM

## 2019-12-04 DIAGNOSIS — Z78.9 ALCOHOL USE: ICD-10-CM

## 2019-12-04 DIAGNOSIS — E66.3 OVERWEIGHT (BMI 25.0-29.9): ICD-10-CM

## 2019-12-04 DIAGNOSIS — D12.6 TUBULOVILLOUS ADENOMA OF COLON: ICD-10-CM

## 2019-12-04 DIAGNOSIS — I10 BENIGN ESSENTIAL HYPERTENSION: ICD-10-CM

## 2019-12-04 DIAGNOSIS — Z12.39 BREAST CANCER SCREENING: ICD-10-CM

## 2019-12-04 DIAGNOSIS — Z12.11 COLON CANCER SCREENING: ICD-10-CM

## 2019-12-04 DIAGNOSIS — Z23 NEED FOR PROPHYLACTIC VACCINATION AND INOCULATION AGAINST INFLUENZA: ICD-10-CM

## 2019-12-04 DIAGNOSIS — Z13.1 SCREENING FOR DIABETES MELLITUS (DM): ICD-10-CM

## 2019-12-04 DIAGNOSIS — Z13.6 CARDIOVASCULAR SCREENING; LDL GOAL LESS THAN 160: ICD-10-CM

## 2019-12-04 DIAGNOSIS — Z00.00 ROUTINE GENERAL MEDICAL EXAMINATION AT A HEALTH CARE FACILITY: Primary | ICD-10-CM

## 2019-12-04 PROCEDURE — 99396 PREV VISIT EST AGE 40-64: CPT | Mod: 25 | Performed by: FAMILY MEDICINE

## 2019-12-04 PROCEDURE — 77067 SCR MAMMO BI INCL CAD: CPT

## 2019-12-04 PROCEDURE — 90471 IMMUNIZATION ADMIN: CPT | Performed by: FAMILY MEDICINE

## 2019-12-04 PROCEDURE — 99213 OFFICE O/P EST LOW 20 MIN: CPT | Mod: 25 | Performed by: FAMILY MEDICINE

## 2019-12-04 PROCEDURE — 90682 RIV4 VACC RECOMBINANT DNA IM: CPT | Performed by: FAMILY MEDICINE

## 2019-12-04 RX ORDER — HYDROCHLOROTHIAZIDE 25 MG/1
25 TABLET ORAL DAILY
Qty: 30 TABLET | Refills: 0 | Status: SHIPPED | OUTPATIENT
Start: 2019-12-04 | End: 2019-12-11 | Stop reason: ALTCHOICE

## 2019-12-04 ASSESSMENT — PAIN SCALES - GENERAL: PAINLEVEL: NO PAIN (0)

## 2019-12-04 ASSESSMENT — MIFFLIN-ST. JEOR: SCORE: 1392.19

## 2019-12-04 NOTE — PROGRESS NOTES
SUBJECTIVE:   CC: Sheri Montoya is an 54 year old woman who presents for preventive health visit.     Healthy Habits:     Getting at least 3 servings of Calcium per day:  NO    Bi-annual eye exam:  Yes    Dental care twice a year:  Yes    Sleep apnea or symptoms of sleep apnea:  None    Diet:  Regular (no restrictions)    Frequency of exercise:  2-3 days/week    Duration of exercise:  Greater than 60 minutes    Taking medications regularly:  Yes    Medication side effects:  None    PHQ-2 Total Score: 0    Additional concerns today:  No    Flu Vaccine - offered, patient would like to discuss. Patient think she might of had the flu aready.    ELEVATED BP-blood pressure continues to be elevated for the past 1+ year.   Denies chest pain, SOB, edema legs, visual concerns, focal neurological symptoms, dizziness, syncope, palpitations.  Patient does not smoke, has family history of in both parents  Drinks 2 beers most on a daily basis  Denies concerns for family or personal history of thyroid disorder, snoring, sleep apnea,NSAID use, anxiety.    Today's PHQ-2 Score:   PHQ-2 ( 1999 Pfizer) 12/1/2019   Q1: Little interest or pleasure in doing things 0   Q2: Feeling down, depressed or hopeless 0   PHQ-2 Score 0   Q1: Little interest or pleasure in doing things Not at all   Q2: Feeling down, depressed or hopeless Not at all   PHQ-2 Score 0       Abuse: Current or Past(Physical, Sexual or Emotional)- No  Do you feel safe in your environment? Yes        Social History     Tobacco Use     Smoking status: Never Smoker     Smokeless tobacco: Never Used   Substance Use Topics     Alcohol use: Yes     Alcohol/week: 5.8 standard drinks     Types: 7 Standard drinks or equivalent per week       Alcohol Use 12/1/2019   Prescreen: >3 drinks/day or >7 drinks/week? No   Prescreen: >3 drinks/day or >7 drinks/week? -       Reviewed orders with patient.  Reviewed health maintenance and updated orders accordingly - Yes  Lab work is in  process  Labs reviewed in EPIC  BP Readings from Last 3 Encounters:   12/04/19 (!) 149/92   02/06/19 142/89   11/21/18 130/80    Wt Readings from Last 3 Encounters:   12/04/19 79.4 kg (175 lb 1.6 oz)   11/21/18 76.7 kg (169 lb)   06/28/17 76.2 kg (168 lb 1.6 oz)                  Patient Active Problem List   Diagnosis     CARDIOVASCULAR SCREENING; LDL GOAL LESS THAN 160     Family history of breast cancer     Overweight (BMI 25.0-29.9)     ASCUS with positive high risk HPV cervical     Elevated BP without diagnosis of hypertension     Benign essential hypertension     Tubulovillous adenoma of colon     Past Surgical History:   Procedure Laterality Date     COLONOSCOPY N/A 9/18/2017    Procedure: COMBINED COLONOSCOPY, SINGLE OR MULTIPLE BIOPSY/POLYPECTOMY BY BIOPSY;;  Surgeon: Milan Cameron MD;  Location: [facility] OR     COLONOSCOPY WITH CO2 INSUFFLATION N/A 9/18/2017    Procedure: COLONOSCOPY WITH CO2 INSUFFLATION;  Screening for colon cancer  Routine general medical examination at a health care facility  bmi 28.19  Pharmacy: [facility] - [place]  ;  Surgeon: Milan Cameron MD;  Location: [facility] OR     CYSTECTOMY PILONIDAL  1996     HC TOOTH EXTRACTION W/FORCEP  2002?     SURGICAL HISTORY OF -       infertility workup-hysteroscope        Social History     Tobacco Use     Smoking status: Never Smoker     Smokeless tobacco: Never Used   Substance Use Topics     Alcohol use: Yes     Alcohol/week: 5.8 standard drinks     Types: 7 Standard drinks or equivalent per week     Family History   Problem Relation Age of Onset     Hypertension Father      Lipids Father      Diabetes Maternal Grandfather      Cerebrovascular Disease Maternal Grandmother      Breast Cancer Mother      Cancer Mother 73        tumors      C.A.D. Paternal Uncle      Asthma No family hx of      Cancer - colorectal No family hx of      Prostate Cancer No family hx of          Current Outpatient Medications   Medication Sig Dispense Refill     clindamycin  (CLEOCIN T) 1 % external lotion Apply daily to affected areas after showering. 60 mL 11     hydrochlorothiazide (HYDRODIURIL) 25 MG tablet Take 1 tablet (25 mg) by mouth daily 30 tablet 0     Multiple Vitamins-Minerals (DAILY MULTIVITAMIN PO) Take 1 tablet by mouth daily       No Known Allergies  Recent Labs   Lab Test 11/21/18  1319 06/28/17  1023 06/10/16  0935 04/07/15  1114   LDL 81 70 82  --    HDL 76 76 53  --    TRIG 149 74 103  --    ALT 20 27  --  34   CR 0.74 0.79  --  0.79   GFRESTIMATED 83 77  --  77   GFRESTBLACK >90 >90  African American GFR Calc    --  >90   GFR Calc     POTASSIUM 3.9 3.7  --  3.8   TSH  --  1.80  --  1.97        Mammogram Screening: Patient over age 50, mutual decision to screen reflected in health maintenance.    Pertinent mammograms are reviewed under the imaging tab.  History of abnormal Pap smear: NO - age 30-65 PAP every 5 years with negative HPV co-testing recommended  PAP / HPV Latest Ref Rng & Units 6/28/2017 6/10/2016 4/7/2015   PAP - NIL OTHER-NIL, See Result NIL   HPV 16 DNA NEG Negative Negative Negative   HPV 18 DNA NEG Negative Negative Negative   OTHER HR HPV NEG Negative Negative Negative     Reviewed and updated as needed this visit by clinical staff  Tobacco  Allergies  Meds  Med Hx  Surg Hx  Fam Hx  Soc Hx        Reviewed and updated as needed this visit by Provider          Past Medical History:   Diagnosis Date     ASCUS with positive high risk HPV 7/23/14    + HR HPV 52     Breast cyst      Chicken pox      H/O colposcopy with cervical biopsy 9/2014    Neg for dysplasia     Infertility     s/p 3 rounds of IVF. Was pregnant only very briefly     Obesity      Positive TB test     Has been on INH Janurary 2012      Past Surgical History:   Procedure Laterality Date     COLONOSCOPY N/A 9/18/2017    Procedure: COMBINED COLONOSCOPY, SINGLE OR MULTIPLE BIOPSY/POLYPECTOMY BY BIOPSY;;  Surgeon: Milan Cameron MD;  Location: [facility] OR     COLONOSCOPY  "WITH CO2 INSUFFLATION N/A 2017    Procedure: COLONOSCOPY WITH CO2 INSUFFLATION;  Screening for colon cancer  Routine general medical examination at a health care facility  bmi 28.19  Pharmacy: [facility]  [place]  ;  Surgeon: Milan Cameron MD;  Location: [facility] OR     CYSTECTOMY PILONIDAL       HC TOOTH EXTRACTION W/FORCEP  ?     SURGICAL HISTORY OF -       infertility workup-hysteroscope      OB History    Para Term  AB Living   0 0 0 0 0 0   SAB TAB Ectopic Multiple Live Births   0 0 0 0 0       Review of Systems  CONSTITUTIONAL: NEGATIVE for fever, chills, change in weight  INTEGUMENTARY/SKIN: NEGATIVE for worrisome rashes, moles or lesions  EYES: NEGATIVE for vision changes or irritation  ENT: NEGATIVE for ear, mouth and throat problems  RESP: NEGATIVE for significant cough or SOB  BREAST: NEGATIVE for masses, tenderness or discharge  CV: NEGATIVE for chest pain, palpitations or peripheral edema  CV: as above  GI: NEGATIVE for nausea, abdominal pain, heartburn, or change in bowel habits  : NEGATIVE for unusual urinary or vaginal symptoms. No vaginal bleeding.  MUSCULOSKELETAL: NEGATIVE for significant arthralgias or myalgia  NEURO: NEGATIVE for weakness, dizziness or paresthesias  ENDOCRINE: NEGATIVE for temperature intolerance, skin/hair changes  HEME/ALLERGY/IMMUNE: NEGATIVE for bleeding problems  PSYCHIATRIC: NEGATIVE for changes in mood or affect      OBJECTIVE:   BP (!) 149/92   Pulse 80   Temp 97.4  F (36.3  C) (Oral)   Ht 1.638 m (5' 4.5\")   Wt 79.4 kg (175 lb 1.6 oz)   LMP 2019 (Approximate)   SpO2 99%   BMI 29.59 kg/m    Physical Exam  GENERAL APPEARANCE: healthy, alert and no distress  EYES: Eyes grossly normal to inspection, PERRL and conjunctivae and sclerae normal  HENT: ear canals and TM's normal, nose and mouth without ulcers or lesions, oropharynx clear and oral mucous membranes moist  NECK: no adenopathy, no asymmetry, masses, or scars and thyroid normal " to palpation  RESP: lungs clear to auscultation - no rales, rhonchi or wheezes  BREAST: normal without masses, tenderness or nipple discharge and no palpable axillary masses or adenopathy  CV: regular rate and rhythm, normal S1 S2, no S3 or S4, no murmur, click or rub, no peripheral edema and peripheral pulses strong  ABDOMEN: soft, nontender, no hepatosplenomegaly, no masses and bowel sounds normal   (female): normal female external genitalia, normal urethral meatus, vaginal mucosal atrophy noted, normal cervix, adnexae, and uterus without masses or abnormal discharge  MS: no musculoskeletal defects are noted and gait is age appropriate without ataxia  SKIN: no suspicious lesions or rashes  NEURO: Normal strength and tone, sensory exam grossly normal, mentation intact and speech normal  PSYCH: mentation appears normal and affect normal/bright    Diagnostic Test Results:  Labs reviewed in Epic    ASSESSMENT/PLAN:   1. Routine general medical examination at a health care facility  Discussed on regular exercises, daily calcium intake, healthy eating, self breast exams monthly and routine dental checks    - CBC with platelets differential; Future  - **Comprehensive metabolic panel FUTURE anytime; Future  - Lipid panel reflex to direct LDL Fasting; Future  - **TSH with free T4 reflex FUTURE anytime; Future  - Albumin Random Urine Quantitative with Creat Ratio; Future    2. Benign essential hypertension  BP Readings from Last 6 Encounters:   12/04/19 (!) 149/92   02/06/19 142/89   11/21/18 130/80   11/06/18 (!) 143/93   09/18/17 125/79   06/28/17 126/78     Initial blood pressure was 150/96, rechecked-149/92  Reviewed the etiology of hypertension.  Given ongoing elevated blood pressures for more than a year now, recommended to start on treatment along with lifestyle changes including reducing alcohol intake to 0 to 1/day, following a low-salt diet, regular exercises and weight loss.  Recommended to start on  hydrochlorothiazide 25 mg once daily, follow for blood pressure recheck in 1 to 2 weeks along with fasting labs  Dosing and potential medication side effects discussed.  Patient verbalised understanding and is agreeable to the plan.    - hydrochlorothiazide (HYDRODIURIL) 25 MG tablet; Take 1 tablet (25 mg) by mouth daily  Dispense: 30 tablet; Refill: 0  - CBC with platelets differential; Future  - **Comprehensive metabolic panel FUTURE anytime; Future  - **TSH with free T4 reflex FUTURE anytime; Future  - Albumin Random Urine Quantitative with Creat Ratio; Future    3. Need for prophylactic vaccination and inoculation against influenza    - INFLUENZA QUAD, RECOMBINANT, P-FREE (RIV4) (FLUBLOCK) [71233]  - Vaccine Administration, Initial [88586]    4. Overweight (BMI 25.0-29.9)  Wt Readings from Last 5 Encounters:   12/04/19 79.4 kg (175 lb 1.6 oz)   11/21/18 76.7 kg (169 lb)   06/28/17 76.2 kg (168 lb 1.6 oz)   06/10/16 79.5 kg (175 lb 3.2 oz)   04/07/15 82.6 kg (182 lb)     Emphasized on weight loss, portion control, low calorie and low fat diet, healthy eating, regular exercises.      5. CARDIOVASCULAR SCREENING; LDL GOAL LESS THAN 160    - Lipid panel reflex to direct LDL Fasting; Future    6. Family history of breast cancer  Continue with annual mammograms    7. Breast cancer screening  as above      8. Colon cancer screening  Reviewed colonoscopy from 2017 showing tubulovillous adenoma, recheck in 2020    9. Cervical cancer screening  Patient is not due for Pap today    10. Need for shingles vaccine  Recommended patient to have it done at the pharmacy after checking with insurance for coverage.      11. Tubulovillous adenoma of colon  As above in problem #8    12. Alcohol use  Patient is currently doing 2 servings of alcohol-either beer or wine daily  1 Limit alcohol consumption to 0-1 drinks per day (1 drink=5 oz.wine, 12 oz. Beer or 1.5 oz. 80-proof liquor).  - **Comprehensive metabolic panel FUTURE anytime;  "Future    13. Screening for deficiency anemia    - CBC with platelets differential; Future    14. Screening for diabetes mellitus (DM)    - **Comprehensive metabolic panel FUTURE anytime; Future    15. Screening for thyroid disorder    - **TSH with free T4 reflex FUTURE anytime; Future    COUNSELING:  Reviewed preventive health counseling, as reflected in patient instructions  Special attention given to:        Regular exercise       Healthy diet/nutrition       Vision screening       Immunizations    Vaccinated for: Influenza             Osteoporosis Prevention/Bone Health       Colon cancer screening       (Aziza)menopause management       The 10-year ASCVD risk score (Derek STRICKLAND Jr., et al., 2013) is: 1.4%    Values used to calculate the score:      Age: 54 years      Sex: Female      Is Non- : No      Diabetic: No      Tobacco smoker: No      Systolic Blood Pressure: 149 mmHg      Is BP treated: No      HDL Cholesterol: 76 mg/dL      Total Cholesterol: 187 mg/dL       Advance Care Planning    Estimated body mass index is 29.59 kg/m  as calculated from the following:    Height as of this encounter: 1.638 m (5' 4.5\").    Weight as of this encounter: 79.4 kg (175 lb 1.6 oz).    Weight management plan: Discussed healthy diet and exercise guidelines     reports that she has never smoked. She has never used smokeless tobacco.      Counseling Resources:  ATP IV Guidelines  Pooled Cohorts Equation Calculator  Breast Cancer Risk Calculator  FRAX Risk Assessment  ICSI Preventive Guidelines  Dietary Guidelines for Americans, 2010  USDA's MyPlate  ASA Prophylaxis  Lung CA Screening    Ary Saldivar MD  [facility] [[email or web address]]  Chart documentation done in part with Dragon Voice recognition Software. Although reviewed after completion, some word and grammatical error may remain.    "

## 2019-12-04 NOTE — PATIENT INSTRUCTIONS
Start on hydrochlorothiazide 25mg daily  Schedule for recheck and fasting labs next Wednesday  Get the flu shot today    Preventive Health Recommendations  Female Ages 50 - 64    Yearly exam: See your health care provider every year in order to  o Review health changes.   o Discuss preventive care.    o Review your medicines if your doctor has prescribed any.      Get a Pap test every three years (unless you have an abnormal result and your provider advises testing more often).    If you get Pap tests with HPV test, you only need to test every 5 years, unless you have an abnormal result.     You do not need a Pap test if your uterus was removed (hysterectomy) and you have not had cancer.    You should be tested each year for STDs (sexually transmitted diseases) if you're at risk.     Have a mammogram every 1 to 2 years.    Have a colonoscopy at age 50, or have a yearly FIT test (stool test). These exams screen for colon cancer.      Have a cholesterol test every 5 years, or more often if advised.    Have a diabetes test (fasting glucose) every three years. If you are at risk for diabetes, you should have this test more often.     If you are at risk for osteoporosis (brittle bone disease), think about having a bone density scan (DEXA).    Shots: Get a flu shot each year. Get a tetanus shot every 10 years.    Nutrition:     Eat at least 5 servings of fruits and vegetables each day.    Eat whole-grain bread, whole-wheat pasta and brown rice instead of white grains and rice.    Get adequate Calcium and Vitamin D.     Lifestyle    Exercise at least 150 minutes a week (30 minutes a day, 5 days a week). This will help you control your weight and prevent disease.    Limit alcohol to one drink per day.    No smoking.     Wear sunscreen to prevent skin cancer.     See your dentist every six months for an exam and cleaning.    See your eye doctor every 1 to 2 years.

## 2019-12-04 NOTE — PROGRESS NOTES
"   SUBJECTIVE:   CC: Sheri Montoya is an 54 year old woman who presents for preventive health visit.     Healthy Habits:    Do you get at least three servings of calcium containing foods daily (dairy, green leafy vegetables, etc.)? { :965113::\"yes\"}    Amount of exercise or daily activities, outside of work: { :991633}    Problems taking medications regularly { :381437::\"No\"}    Medication side effects: { :167887::\"No\"}    Have you had an eye exam in the past two years? { :491290}    Do you see a dentist twice per year? { :012297}    Do you have sleep apnea, excessive snoring or daytime drowsiness?{ :295627}  {Outside tests to abstract? :873151}    {additional problems to add (Optional):238209}    Today's PHQ-2 Score:   PHQ-2 ( 1999 [employer]) 12/1/2019 7/30/2019   Q1: Little interest or pleasure in doing things 0 0   Q2: Feeling down, depressed or hopeless 0 0   PHQ-2 Score 0 0   Q1: Little interest or pleasure in doing things Not at all -   Q2: Feeling down, depressed or hopeless Not at all -   PHQ-2 Score 0 -     {PHQ-2 LOOK IN ASSESSMENTS (Optional) :307233}  Abuse: Current or Past(Physical, Sexual or Emotional)- {YES/NO/NA:325301}  Do you feel safe in your environment? {YES/NO/NA:046162}        Social History     Tobacco Use     Smoking status: Never Smoker     Smokeless tobacco: Never Used   Substance Use Topics     Alcohol use: Yes     Alcohol/week: 5.8 standard drinks     Types: 7 Standard drinks or equivalent per week     If you drink alcohol do you typically have >3 drinks per day or >7 drinks per week? {ETOH :146471}                     Reviewed orders with patient.  Reviewed health maintenance and updated orders accordingly - {Yes/No:039185::\"Yes\"}  {Chronicprobdata (Optional):653455}    {Mammo Decision Support (Optional):407905}    Pertinent mammograms are reviewed under the imaging tab.  History of abnormal Pap smear: {PAP HX:218327}  PAP / HPV Latest Ref Rng & Units 6/28/2017 6/10/2016 4/7/2015   PAP - " "NIL OTHER-NIL, See Result NIL   HPV 16 DNA NEG Negative Negative Negative   HPV 18 DNA NEG Negative Negative Negative   OTHER HR HPV NEG Negative Negative Negative     Reviewed and updated as needed this visit by clinical staff         Reviewed and updated as needed this visit by Provider        {HISTORY OPTIONS (Optional):107006}    ROS:  { :067784}    OBJECTIVE:   There were no vitals taken for this visit.  EXAM:  {Exam Choices:042815}    {Diagnostic Test Results (Optional):271581::\"Diagnostic Test Results:\",\"Labs reviewed in Epic\"}    ASSESSMENT/PLAN:   {Diag Picklist:101711}    COUNSELING:   {FEMALE COUNSELING MESSAGES:888862::\"Reviewed preventive health counseling, as reflected in patient instructions\"}    Estimated body mass index is 28.56 kg/m  as calculated from the following:    Height as of 11/21/18: 1.638 m (5' 4.5\").    Weight as of 11/21/18: 76.7 kg (169 lb).    {Weight Management Plan (ACO) Complete if BMI is abnormal-  Ages 18-64  BMI >24.9.  Age 65+ with BMI <23 or >30 (Optional):582161}     reports that she has never smoked. She has never used smokeless tobacco.  {Tobacco Cessation -- Complete if patient is a smoker (Optional):433026}    Counseling Resources:  ATP IV Guidelines  Pooled Cohorts Equation Calculator  Breast Cancer Risk Calculator  FRAX Risk Assessment  ICSI Preventive Guidelines  Dietary Guidelines for Americans, 2010  USDA's MyPlate  ASA Prophylaxis  Lung CA Screening    Ary Saldivar MD  [facility]  "

## 2019-12-06 NOTE — RESULT ENCOUNTER NOTE
Dear Sheri,  Your mammogram is negative and reassuring.   Let me know if you have any questions. Take care.  Ary Saldivar MD

## 2019-12-11 ENCOUNTER — OFFICE VISIT (OUTPATIENT)
Dept: PEDIATRICS | Facility: CLINIC | Age: 54
End: 2019-12-11
Payer: COMMERCIAL

## 2019-12-11 VITALS
HEART RATE: 90 BPM | DIASTOLIC BLOOD PRESSURE: 98 MMHG | WEIGHT: 175 LBS | BODY MASS INDEX: 29.57 KG/M2 | SYSTOLIC BLOOD PRESSURE: 142 MMHG | TEMPERATURE: 97.5 F | OXYGEN SATURATION: 100 %

## 2019-12-11 DIAGNOSIS — Z13.6 CARDIOVASCULAR SCREENING; LDL GOAL LESS THAN 160: ICD-10-CM

## 2019-12-11 DIAGNOSIS — Z00.00 ROUTINE GENERAL MEDICAL EXAMINATION AT A HEALTH CARE FACILITY: ICD-10-CM

## 2019-12-11 DIAGNOSIS — I10 BENIGN ESSENTIAL HYPERTENSION: Primary | ICD-10-CM

## 2019-12-11 DIAGNOSIS — Z13.0 SCREENING FOR DEFICIENCY ANEMIA: ICD-10-CM

## 2019-12-11 DIAGNOSIS — Z13.1 SCREENING FOR DIABETES MELLITUS (DM): ICD-10-CM

## 2019-12-11 DIAGNOSIS — I10 BENIGN ESSENTIAL HYPERTENSION: ICD-10-CM

## 2019-12-11 DIAGNOSIS — Z13.29 SCREENING FOR THYROID DISORDER: ICD-10-CM

## 2019-12-11 DIAGNOSIS — Z78.9 ALCOHOL USE: ICD-10-CM

## 2019-12-11 PROBLEM — I51.89 DIASTOLIC DYSFUNCTION: Status: ACTIVE | Noted: 2019-12-11

## 2019-12-11 PROBLEM — R74.01 ELEVATED ALT MEASUREMENT: Status: ACTIVE | Noted: 2019-12-11

## 2019-12-11 PROBLEM — R06.09 DOE (DYSPNEA ON EXERTION): Status: ACTIVE | Noted: 2019-12-11

## 2019-12-11 PROBLEM — R94.39 ABNORMAL CARDIOVASCULAR STRESS TEST: Status: ACTIVE | Noted: 2019-12-11

## 2019-12-11 LAB
ALBUMIN SERPL-MCNC: 3.8 G/DL (ref 3.4–5)
ALP SERPL-CCNC: 70 U/L (ref 40–150)
ALT SERPL W P-5'-P-CCNC: 52 U/L (ref 0–50)
ANION GAP SERPL CALCULATED.3IONS-SCNC: 7 MMOL/L (ref 3–14)
AST SERPL W P-5'-P-CCNC: 35 U/L (ref 0–45)
BASOPHILS # BLD AUTO: 0 10E9/L (ref 0–0.2)
BASOPHILS NFR BLD AUTO: 0.6 %
BILIRUB SERPL-MCNC: 0.4 MG/DL (ref 0.2–1.3)
BUN SERPL-MCNC: 9 MG/DL (ref 7–30)
CALCIUM SERPL-MCNC: 9.2 MG/DL (ref 8.5–10.1)
CHLORIDE SERPL-SCNC: 103 MMOL/L (ref 94–109)
CHOLEST SERPL-MCNC: 156 MG/DL
CO2 SERPL-SCNC: 28 MMOL/L (ref 20–32)
CREAT SERPL-MCNC: 0.82 MG/DL (ref 0.52–1.04)
CREAT UR-MCNC: 69 MG/DL
DIFFERENTIAL METHOD BLD: NORMAL
EOSINOPHIL # BLD AUTO: 0.1 10E9/L (ref 0–0.7)
EOSINOPHIL NFR BLD AUTO: 1.5 %
ERYTHROCYTE [DISTWIDTH] IN BLOOD BY AUTOMATED COUNT: 12.8 % (ref 10–15)
GFR SERPL CREATININE-BSD FRML MDRD: 82 ML/MIN/{1.73_M2}
GLUCOSE SERPL-MCNC: 98 MG/DL (ref 70–99)
HCT VFR BLD AUTO: 39.8 % (ref 35–47)
HDLC SERPL-MCNC: 61 MG/DL
HGB BLD-MCNC: 13.3 G/DL (ref 11.7–15.7)
IMM GRANULOCYTES # BLD: 0 10E9/L (ref 0–0.4)
IMM GRANULOCYTES NFR BLD: 0.4 %
LDLC SERPL CALC-MCNC: 80 MG/DL
LYMPHOCYTES # BLD AUTO: 1.2 10E9/L (ref 0.8–5.3)
LYMPHOCYTES NFR BLD AUTO: 23.1 %
MCH RBC QN AUTO: 30 PG (ref 26.5–33)
MCHC RBC AUTO-ENTMCNC: 33.4 G/DL (ref 31.5–36.5)
MCV RBC AUTO: 90 FL (ref 78–100)
MICROALBUMIN UR-MCNC: 15 MG/L
MICROALBUMIN/CREAT UR: 22.14 MG/G CR (ref 0–25)
MONOCYTES # BLD AUTO: 0.6 10E9/L (ref 0–1.3)
MONOCYTES NFR BLD AUTO: 10.4 %
NEUTROPHILS # BLD AUTO: 3.4 10E9/L (ref 1.6–8.3)
NEUTROPHILS NFR BLD AUTO: 64 %
NONHDLC SERPL-MCNC: 95 MG/DL
PLATELET # BLD AUTO: 249 10E9/L (ref 150–450)
POTASSIUM SERPL-SCNC: 3.5 MMOL/L (ref 3.4–5.3)
PROT SERPL-MCNC: 7.7 G/DL (ref 6.8–8.8)
RBC # BLD AUTO: 4.44 10E12/L (ref 3.8–5.2)
SODIUM SERPL-SCNC: 138 MMOL/L (ref 133–144)
TRIGL SERPL-MCNC: 75 MG/DL
TSH SERPL DL<=0.005 MIU/L-ACNC: 1.21 MU/L (ref 0.4–4)
WBC # BLD AUTO: 5.4 10E9/L (ref 4–11)

## 2019-12-11 PROCEDURE — 36415 COLL VENOUS BLD VENIPUNCTURE: CPT | Performed by: FAMILY MEDICINE

## 2019-12-11 PROCEDURE — 80050 GENERAL HEALTH PANEL: CPT | Performed by: FAMILY MEDICINE

## 2019-12-11 PROCEDURE — 82043 UR ALBUMIN QUANTITATIVE: CPT | Performed by: FAMILY MEDICINE

## 2019-12-11 PROCEDURE — 99213 OFFICE O/P EST LOW 20 MIN: CPT | Performed by: FAMILY MEDICINE

## 2019-12-11 PROCEDURE — 80061 LIPID PANEL: CPT | Performed by: FAMILY MEDICINE

## 2019-12-11 RX ORDER — LISINOPRIL AND HYDROCHLOROTHIAZIDE 20; 25 MG/1; MG/1
1 TABLET ORAL DAILY
Qty: 30 TABLET | Refills: 0 | Status: SHIPPED | OUTPATIENT
Start: 2019-12-11 | End: 2019-12-30

## 2019-12-11 ASSESSMENT — PAIN SCALES - GENERAL: PAINLEVEL: NO PAIN (0)

## 2019-12-11 NOTE — PROGRESS NOTES
Subjective     Sheri Montoya is a 54 year old female who presents to clinic today for the following health issues:    HPI   Hypertension Follow-up  Patient is here for hypertension recheck after starting on hydrochlorothiazide at her visit a week ago    Do you check your blood pressure regularly outside of the clinic? No     Are you following a low salt diet? Yes, trying to.    Are your blood pressures ever more than 140 on the top number (systolic) OR more   than 90 on the bottom number (diastolic), for example 140/90? No      How many servings of fruits and vegetables do you eat daily?  0-1    On average, how many sweetened beverages do you drink each day (Examples: soda, juice, sweet tea, etc.  Do NOT count diet or artificially sweetened beverages)?   0    How many days per week do you miss taking your medication? 0        Patient Active Problem List   Diagnosis     CARDIOVASCULAR SCREENING; LDL GOAL LESS THAN 160     Family history of breast cancer     Overweight (BMI 25.0-29.9)     ASCUS with positive high risk HPV cervical     Elevated BP without diagnosis of hypertension     Benign essential hypertension     Tubulovillous adenoma of colon     Past Surgical History:   Procedure Laterality Date     COLONOSCOPY N/A 9/18/2017    Procedure: COMBINED COLONOSCOPY, SINGLE OR MULTIPLE BIOPSY/POLYPECTOMY BY BIOPSY;;  Surgeon: Milan Cameron MD;  Location: [facility] OR     COLONOSCOPY WITH CO2 INSUFFLATION N/A 9/18/2017    Procedure: COLONOSCOPY WITH CO2 INSUFFLATION;  Screening for colon cancer  Routine general medical examination at a health care facility  bmi 28.19  Pharmacy: [facility] [place]  ;  Surgeon: Milan Cameron MD;  Location: [facility] OR     CYSTECTOMY PILONIDAL  1996     HC TOOTH EXTRACTION W/FORCEP  2002?     SURGICAL HISTORY OF -       infertility workup-hysteroscope        Social History     Tobacco Use     Smoking status: Never Smoker     Smokeless tobacco: Never Used   Substance Use Topics     Alcohol use:  Yes     Alcohol/week: 5.8 standard drinks     Types: 7 Standard drinks or equivalent per week     Family History   Problem Relation Age of Onset     Hypertension Father      Lipids Father      Diabetes Maternal Grandfather      Cerebrovascular Disease Maternal Grandmother      Breast Cancer Mother      Cancer Mother 73        tumors      C.A.D. Paternal Uncle      Asthma No family hx of      Cancer - colorectal No family hx of      Prostate Cancer No family hx of          Current Outpatient Medications   Medication Sig Dispense Refill     clindamycin (CLEOCIN T) 1 % external lotion Apply daily to affected areas after showering. 60 mL 11     lisinopril-hydrochlorothiazide (PRINZIDE/ZESTORETIC) 20-25 MG tablet Take 1 tablet by mouth daily 30 tablet 0     Multiple Vitamins-Minerals (DAILY MULTIVITAMIN PO) Take 1 tablet by mouth daily       No Known Allergies  Recent Labs   Lab Test 12/11/19  1044 11/21/18  1319 06/28/17  1023   LDL 80 81 70   HDL 61 76 76   TRIG 75 149 74   ALT 52* 20 27   CR 0.82 0.74 0.79   GFRESTIMATED 82 83 77   GFRESTBLACK >90 >90 >90  African American GFR Calc     POTASSIUM 3.5 3.9 3.7   TSH 1.21  --  1.80      BP Readings from Last 3 Encounters:   12/11/19 (!) 142/98   12/04/19 (!) 149/92   02/06/19 142/89    Wt Readings from Last 3 Encounters:   12/11/19 79.4 kg (175 lb)   12/04/19 79.4 kg (175 lb 1.6 oz)   11/21/18 76.7 kg (169 lb)                      Reviewed and updated as needed this visit by Provider         Review of Systems   ROS COMP: CONSTITUTIONAL: NEGATIVE for fever, chills, change in weight  RESP: NEGATIVE for significant cough or SOB  CV: NEGATIVE for chest pain, palpitations or peripheral edema  CV: Hx HTN  MUSCULOSKELETAL: NEGATIVE for significant arthralgias or myalgia  NEURO: NEGATIVE for weakness, dizziness or paresthesias  PSYCHIATRIC: NEGATIVE for changes in mood or affect      Objective    LMP 11/14/2019 (Approximate)   There is no height or weight on file to calculate  BMI.  Physical Exam   GENERAL: healthy, alert and no distress  RESP: lungs clear to auscultation - no rales, rhonchi or wheezes  CV: regular rate and rhythm, normal S1 S2, no S3 or S4, no murmur, click or rub, no peripheral edema and peripheral pulses strong  MS: no gross musculoskeletal defects noted, no edema  NEURO: Normal strength and tone, mentation intact and speech normal  PSYCH: mentation appears normal, affect normal/bright    Diagnostic Test Results:  Labs reviewed in Epic        Assessment & Plan     1. Benign essential hypertension  Results for orders placed or performed in visit on 12/11/19   **TSH with free T4 reflex FUTURE anytime     Status: None   Result Value Ref Range    TSH 1.21 0.40 - 4.00 mU/L   Lipid panel reflex to direct LDL Fasting     Status: None   Result Value Ref Range    Cholesterol 156 <200 mg/dL    Triglycerides 75 <150 mg/dL    HDL Cholesterol 61 >49 mg/dL    LDL Cholesterol Calculated 80 <100 mg/dL    Non HDL Cholesterol 95 <130 mg/dL   **Comprehensive metabolic panel FUTURE anytime     Status: Abnormal   Result Value Ref Range    Sodium 138 133 - 144 mmol/L    Potassium 3.5 3.4 - 5.3 mmol/L    Chloride 103 94 - 109 mmol/L    Carbon Dioxide 28 20 - 32 mmol/L    Anion Gap 7 3 - 14 mmol/L    Glucose 98 70 - 99 mg/dL    Urea Nitrogen 9 7 - 30 mg/dL    Creatinine 0.82 0.52 - 1.04 mg/dL    GFR Estimate 82 >60 mL/min/[1.73_m2]    GFR Estimate If Black >90 >60 mL/min/[1.73_m2]    Calcium 9.2 8.5 - 10.1 mg/dL    Bilirubin Total 0.4 0.2 - 1.3 mg/dL    Albumin 3.8 3.4 - 5.0 g/dL    Protein Total 7.7 6.8 - 8.8 g/dL    Alkaline Phosphatase 70 40 - 150 U/L    ALT 52 (H) 0 - 50 U/L    AST 35 0 - 45 U/L   CBC with platelets differential     Status: None   Result Value Ref Range    WBC 5.4 4.0 - 11.0 10e9/L    RBC Count 4.44 3.8 - 5.2 10e12/L    Hemoglobin 13.3 11.7 - 15.7 g/dL    Hematocrit 39.8 35.0 - 47.0 %    MCV 90 78 - 100 fl    MCH 30.0 26.5 - 33.0 pg    MCHC 33.4 31.5 - 36.5 g/dL    RDW  12.8 10.0 - 15.0 %    Platelet Count 249 150 - 450 10e9/L    Diff Method Automated Method     % Neutrophils 64.0 %    % Lymphocytes 23.1 %    % Monocytes 10.4 %    % Eosinophils 1.5 %    % Basophils 0.6 %    % Immature Granulocytes 0.4 %    Absolute Neutrophil 3.4 1.6 - 8.3 10e9/L    Absolute Lymphocytes 1.2 0.8 - 5.3 10e9/L    Absolute Monocytes 0.6 0.0 - 1.3 10e9/L    Absolute Eosinophils 0.1 0.0 - 0.7 10e9/L    Absolute Basophils 0.0 0.0 - 0.2 10e9/L    Abs Immature Granulocytes 0.0 0 - 0.4 10e9/L       BP Readings from Last 6 Encounters:   12/11/19 (!) 142/98   12/04/19 (!) 149/92   02/06/19 142/89   11/21/18 130/80   11/06/18 (!) 143/93   09/18/17 125/79     Initial blood pressure was 142/109, rechecked-142/98.  Blood pressure still not at goal.  Recommended to stop taking hydrochlorthiazide, start on Zestoretic, follow for blood pressure recheck with the staff in 2 weeks.  Continue with efforts on healthy eating, regular exercises, low-salt diet.  If blood pressure is at goal at that time, he will follow-up with PCP in 3 to 4 months for recheck  Dosing and potential medication side effects discussed.  Reviewed normal BMP lab results from today  Patient verbalised understanding and is agreeable to the plan.    - lisinopril-hydrochlorothiazide (PRINZIDE/ZESTORETIC) 20-25 MG tablet; Take 1 tablet by mouth daily  Dispense: 30 tablet; Refill: 0       Regular exercise  Chart documentation done in part with Dragon Voice recognition Software. Although reviewed after completion, some word and grammatical error may remain.    See Patient Instructions    Return in about 2 weeks (around 12/25/2019) for BP Recheck with Nurse.    Ary Saldivar MD  [facility]

## 2019-12-11 NOTE — PATIENT INSTRUCTIONS
Stop hydrochlorothiazide  Start on ZESTORETIC daily  Schedule for recheck on BP with the staff in 2 weeks

## 2019-12-12 NOTE — RESULT ENCOUNTER NOTE
Dear Sheri,  Your labs indicated normal thyroid, hemoglobin, fasting glucose with no concern for diabetes,kidney tests ans fasting cholesterol. These are excellent, your liver tests are normal except for slightly elevated liver enzyme- ALT, though it is very mild. We will recheck this at your next visit in 6 months.  Let me know if you have any questions. Take care.  Ary Saldivar MD

## 2019-12-30 ENCOUNTER — ALLIED HEALTH/NURSE VISIT (OUTPATIENT)
Dept: PEDIATRICS | Facility: CLINIC | Age: 54
End: 2019-12-30
Payer: COMMERCIAL

## 2019-12-30 VITALS — SYSTOLIC BLOOD PRESSURE: 128 MMHG | HEART RATE: 76 BPM | DIASTOLIC BLOOD PRESSURE: 80 MMHG

## 2019-12-30 DIAGNOSIS — I10 BENIGN ESSENTIAL HYPERTENSION: Primary | ICD-10-CM

## 2019-12-30 DIAGNOSIS — I10 BENIGN ESSENTIAL HYPERTENSION: ICD-10-CM

## 2019-12-30 PROCEDURE — 99211 OFF/OP EST MAY X REQ PHY/QHP: CPT

## 2019-12-30 RX ORDER — LISINOPRIL AND HYDROCHLOROTHIAZIDE 20; 25 MG/1; MG/1
1 TABLET ORAL DAILY
Qty: 90 TABLET | Refills: 0 | Status: SHIPPED | OUTPATIENT
Start: 2019-12-30 | End: 2020-01-03

## 2019-12-30 NOTE — PROGRESS NOTES
Sheri Montoya comes into clinic today at the request of Dr. Saldivar, ordering provider, for a blood pressure check.      Today's reading: /80 (BP Location: Right arm, Patient Position: Sitting, Cuff Size: Adult Regular)   Pulse 76      History   Smoking Status     Never Smoker   Smokeless Tobacco     Never Used       Current Outpatient Medications   Medication Sig     Multiple Vitamins-Minerals (DAILY MULTIVITAMIN PO) Take 1 tablet by mouth daily     clindamycin (CLEOCIN T) 1 % external lotion Apply daily to affected areas after showering.     lisinopril-hydrochlorothiazide (PRINZIDE/ZESTORETIC) 20-25 MG tablet Take 1 tablet by mouth daily     No current facility-administered medications for this visit.         She is having her blood pressure monitored because it has been mildly elevated and has been on medication.    Sheri has had the following symptoms: None - denies cough, headaches and light-headedness    We discussed Medication compliance - patient is compliant with medication.  Also discussed life stressors.  Lisinopril-hydrochlorothiazide refilled x 3 months, advised to follow up with Dr. Saldivar when running low on medication supply (3-4 months).        This service provided today was under the supervising provider of the day Dr. Kennedy, who was available if needed.      Nessa Maier RN

## 2020-01-03 DIAGNOSIS — I10 BENIGN ESSENTIAL HYPERTENSION: ICD-10-CM

## 2020-01-03 RX ORDER — LISINOPRIL AND HYDROCHLOROTHIAZIDE 20; 25 MG/1; MG/1
1 TABLET ORAL DAILY
Qty: 90 TABLET | Refills: 3 | Status: SHIPPED | OUTPATIENT
Start: 2020-01-03 | End: 2020-04-01

## 2020-04-01 ENCOUNTER — OFFICE VISIT (OUTPATIENT)
Dept: PEDIATRICS | Facility: CLINIC | Age: 55
End: 2020-04-01
Payer: COMMERCIAL

## 2020-04-01 VITALS
BODY MASS INDEX: 28.87 KG/M2 | SYSTOLIC BLOOD PRESSURE: 135 MMHG | OXYGEN SATURATION: 99 % | DIASTOLIC BLOOD PRESSURE: 84 MMHG | HEIGHT: 64 IN | WEIGHT: 169.1 LBS | TEMPERATURE: 98.4 F | HEART RATE: 82 BPM

## 2020-04-01 DIAGNOSIS — Z12.39 BREAST CANCER SCREENING: Primary | ICD-10-CM

## 2020-04-01 DIAGNOSIS — I10 BENIGN ESSENTIAL HYPERTENSION: ICD-10-CM

## 2020-04-01 PROCEDURE — 99213 OFFICE O/P EST LOW 20 MIN: CPT | Performed by: FAMILY MEDICINE

## 2020-04-01 RX ORDER — LISINOPRIL AND HYDROCHLOROTHIAZIDE 20; 25 MG/1; MG/1
1 TABLET ORAL DAILY
Qty: 90 TABLET | Refills: 3 | Status: SHIPPED | OUTPATIENT
Start: 2020-04-01 | End: 2020-10-19

## 2020-04-01 ASSESSMENT — MIFFLIN-ST. JEOR: SCORE: 1357.03

## 2020-04-01 NOTE — PROGRESS NOTES
Subjective     Sheri Montoya is a 54 year old female who presents to clinic today for the following health issues:    HPI   Hypertension Follow-up    Do you check your blood pressure regularly outside of the clinic? Yes     Are you following a low salt diet? No     Are your blood pressures ever more than 140 on the top number (systolic) OR more   than 90 on the bottom number (diastolic), for example 140/90? No      How many servings of fruits and vegetables do you eat daily?  0-1    On average, how many sweetened beverages do you drink each day (Examples: soda, juice, sweet tea, etc.  Do NOT count diet or artificially sweetened beverages)?   0    How many days per week do you exercise enough to make your heart beat faster? 3 or less    How many minutes a day do you exercise enough to make your heart beat faster? 9 or less  How many days per week do you miss taking your medication?  1    What makes it hard for you to take your medications?  remembering to take        Patient Active Problem List   Diagnosis     CARDIOVASCULAR SCREENING; LDL GOAL LESS THAN 160     Family history of breast cancer     Overweight (BMI 25.0-29.9)     ASCUS with positive high risk HPV cervical     Benign essential hypertension     Tubulovillous adenoma of colon     Elevated ALT measurement     Past Surgical History:   Procedure Laterality Date     COLONOSCOPY N/A 9/18/2017    Procedure: COMBINED COLONOSCOPY, SINGLE OR MULTIPLE BIOPSY/POLYPECTOMY BY BIOPSY;;  Surgeon: Milan Cameron MD;  Location: [facility] OR     COLONOSCOPY WITH CO2 INSUFFLATION N/A 9/18/2017    Procedure: COLONOSCOPY WITH CO2 INSUFFLATION;  Screening for colon cancer  Routine general medical examination at a health care facility  bmi 28.19  Pharmacy: [facility] - [place]  ;  Surgeon: Milan Cameron MD;  Location: [facility] OR     CYSTECTOMY PILONIDAL  1996     HC TOOTH EXTRACTION W/FORCEP  2002?     SURGICAL HISTORY OF -       infertility workup-hysteroscope        Social History      Tobacco Use     Smoking status: Never Smoker     Smokeless tobacco: Never Used   Substance Use Topics     Alcohol use: Yes     Alcohol/week: 5.8 standard drinks     Types: 7 Standard drinks or equivalent per week     Family History   Problem Relation Age of Onset     Hypertension Father      Lipids Father      Diabetes Maternal Grandfather      Cerebrovascular Disease Maternal Grandmother      Breast Cancer Mother      Cancer Mother 73        tumors      C.A.D. Paternal Uncle      Asthma No family hx of      Cancer - colorectal No family hx of      Prostate Cancer No family hx of          Current Outpatient Medications   Medication Sig Dispense Refill     clindamycin (CLEOCIN T) 1 % external lotion Apply daily to affected areas after showering. 60 mL 11     lisinopril-hydrochlorothiazide (ZESTORETIC) 20-25 MG tablet Take 1 tablet by mouth daily Profile Rx 90 tablet 3     Multiple Vitamins-Minerals (DAILY MULTIVITAMIN PO) Take 1 tablet by mouth daily       No Known Allergies  Recent Labs   Lab Test 12/11/19  1044 11/21/18  1319 06/28/17  1023   LDL 80 81 70   HDL 61 76 76   TRIG 75 149 74   ALT 52* 20 27   CR 0.82 0.74 0.79   GFRESTIMATED 82 83 77   GFRESTBLACK >90 >90 >90  African American GFR Calc     POTASSIUM 3.5 3.9 3.7   TSH 1.21  --  1.80      BP Readings from Last 3 Encounters:   04/01/20 135/84   12/30/19 128/80   12/11/19 (!) 142/98    Wt Readings from Last 3 Encounters:   04/01/20 76.7 kg (169 lb 1.6 oz)   12/11/19 79.4 kg (175 lb)   12/04/19 79.4 kg (175 lb 1.6 oz)                    Reviewed and updated as needed this visit by Provider         Review of Systems   ROS COMP: CONSTITUTIONAL: NEGATIVE for fever, chills, change in weight  RESP: NEGATIVE for significant cough or SOB  CV: NEGATIVE for chest pain, palpitations or peripheral edema  CV: Hx HTN  MUSCULOSKELETAL: NEGATIVE for significant arthralgias or myalgia  NEURO: NEGATIVE for weakness, dizziness or paresthesias  PSYCHIATRIC: NEGATIVE for  "changes in mood or affect      Objective    /84 (BP Location: Right arm, Patient Position: Sitting, Cuff Size: Adult Regular)   Pulse 82   Temp 98.4  F (36.9  C) (Oral)   Ht 1.626 m (5' 4\")   Wt 76.7 kg (169 lb 1.6 oz)   SpO2 99%   BMI 29.03 kg/m    Body mass index is 29.03 kg/m .  Physical Exam   GENERAL: healthy, alert and no distress  RESP: lungs clear to auscultation - no rales, rhonchi or wheezes  CV: regular rate and rhythm, normal S1 S2, no S3 or S4, no murmur, click or rub, no peripheral edema and peripheral pulses strong  MS: no gross musculoskeletal defects noted, no edema  PSYCH: mentation appears normal, affect normal/bright    Diagnostic Test Results:  Labs reviewed in Epic        Assessment & Plan     1. Benign essential hypertension  BP Readings from Last 6 Encounters:   04/01/20 135/84   12/30/19 128/80   12/11/19 (!) 142/98   12/04/19 (!) 149/92   02/06/19 142/89   11/21/18 130/80     Blood pressure is at goal, continue with current dose of Zestoretic  Will follow low salt diet, weight loss and regular exercises.  Follow-up for recheck at the time of physical in December along with fasting labs and mammogram  - lisinopril-hydrochlorothiazide (ZESTORETIC) 20-25 MG tablet; Take 1 tablet by mouth daily Profile Rx  Dispense: 90 tablet; Refill: 3           No follow-ups on file.    Ary Saldivar MD  [facility]    "

## 2020-10-16 ENCOUNTER — DOCUMENTATION ONLY (OUTPATIENT)
Dept: LAB | Facility: CLINIC | Age: 55
End: 2020-10-16

## 2020-10-16 DIAGNOSIS — Z13.1 SCREENING FOR DIABETES MELLITUS (DM): ICD-10-CM

## 2020-10-16 DIAGNOSIS — Z13.0 SCREENING FOR DEFICIENCY ANEMIA: ICD-10-CM

## 2020-10-16 DIAGNOSIS — Z13.6 CARDIOVASCULAR SCREENING; LDL GOAL LESS THAN 160: ICD-10-CM

## 2020-10-16 DIAGNOSIS — I10 BENIGN ESSENTIAL HYPERTENSION: ICD-10-CM

## 2020-10-16 DIAGNOSIS — Z00.00 ROUTINE GENERAL MEDICAL EXAMINATION AT A HEALTH CARE FACILITY: Primary | ICD-10-CM

## 2020-10-19 ENCOUNTER — OFFICE VISIT (OUTPATIENT)
Dept: PEDIATRICS | Facility: CLINIC | Age: 55
End: 2020-10-19
Payer: COMMERCIAL

## 2020-10-19 VITALS
TEMPERATURE: 97.6 F | SYSTOLIC BLOOD PRESSURE: 134 MMHG | BODY MASS INDEX: 29.3 KG/M2 | HEART RATE: 85 BPM | OXYGEN SATURATION: 100 % | DIASTOLIC BLOOD PRESSURE: 83 MMHG | WEIGHT: 171.6 LBS | HEIGHT: 64 IN

## 2020-10-19 DIAGNOSIS — Z12.4 CERVICAL CANCER SCREENING: ICD-10-CM

## 2020-10-19 DIAGNOSIS — D12.6 TUBULOVILLOUS ADENOMA OF COLON: ICD-10-CM

## 2020-10-19 DIAGNOSIS — Z00.00 ROUTINE GENERAL MEDICAL EXAMINATION AT A HEALTH CARE FACILITY: ICD-10-CM

## 2020-10-19 DIAGNOSIS — Z13.0 SCREENING FOR DEFICIENCY ANEMIA: ICD-10-CM

## 2020-10-19 DIAGNOSIS — Z12.31 ENCOUNTER FOR SCREENING MAMMOGRAM FOR MALIGNANT NEOPLASM OF BREAST: ICD-10-CM

## 2020-10-19 DIAGNOSIS — Z00.00 ROUTINE GENERAL MEDICAL EXAMINATION AT A HEALTH CARE FACILITY: Primary | ICD-10-CM

## 2020-10-19 DIAGNOSIS — Z23 NEED FOR SHINGLES VACCINE: ICD-10-CM

## 2020-10-19 DIAGNOSIS — Z71.89 ADVANCE CARE PLANNING: ICD-10-CM

## 2020-10-19 DIAGNOSIS — Z12.11 COLON CANCER SCREENING: ICD-10-CM

## 2020-10-19 DIAGNOSIS — Z80.3 FAMILY HISTORY OF BREAST CANCER: ICD-10-CM

## 2020-10-19 DIAGNOSIS — I10 BENIGN ESSENTIAL HYPERTENSION: ICD-10-CM

## 2020-10-19 DIAGNOSIS — Z13.6 CARDIOVASCULAR SCREENING; LDL GOAL LESS THAN 160: ICD-10-CM

## 2020-10-19 DIAGNOSIS — Z13.1 SCREENING FOR DIABETES MELLITUS (DM): ICD-10-CM

## 2020-10-19 LAB
ALBUMIN SERPL-MCNC: 3.6 G/DL (ref 3.4–5)
ALP SERPL-CCNC: 70 U/L (ref 40–150)
ALT SERPL W P-5'-P-CCNC: 21 U/L (ref 0–50)
ANION GAP SERPL CALCULATED.3IONS-SCNC: 5 MMOL/L (ref 3–14)
AST SERPL W P-5'-P-CCNC: 11 U/L (ref 0–45)
BASOPHILS # BLD AUTO: 0.1 10E9/L (ref 0–0.2)
BASOPHILS NFR BLD AUTO: 0.9 %
BILIRUB SERPL-MCNC: 0.4 MG/DL (ref 0.2–1.3)
BUN SERPL-MCNC: 10 MG/DL (ref 7–30)
CALCIUM SERPL-MCNC: 8.6 MG/DL (ref 8.5–10.1)
CHLORIDE SERPL-SCNC: 103 MMOL/L (ref 94–109)
CHOLEST SERPL-MCNC: 203 MG/DL
CO2 SERPL-SCNC: 28 MMOL/L (ref 20–32)
CREAT SERPL-MCNC: 0.7 MG/DL (ref 0.52–1.04)
CREAT UR-MCNC: 40 MG/DL
DIFFERENTIAL METHOD BLD: NORMAL
EOSINOPHIL # BLD AUTO: 0.1 10E9/L (ref 0–0.7)
EOSINOPHIL NFR BLD AUTO: 2.3 %
ERYTHROCYTE [DISTWIDTH] IN BLOOD BY AUTOMATED COUNT: 12.6 % (ref 10–15)
GFR SERPL CREATININE-BSD FRML MDRD: >90 ML/MIN/{1.73_M2}
GLUCOSE SERPL-MCNC: 92 MG/DL (ref 70–99)
HCT VFR BLD AUTO: 38.7 % (ref 35–47)
HDLC SERPL-MCNC: 68 MG/DL
HGB BLD-MCNC: 12.8 G/DL (ref 11.7–15.7)
IMM GRANULOCYTES # BLD: 0 10E9/L (ref 0–0.4)
IMM GRANULOCYTES NFR BLD: 0.4 %
LDLC SERPL CALC-MCNC: 114 MG/DL
LYMPHOCYTES # BLD AUTO: 1.3 10E9/L (ref 0.8–5.3)
LYMPHOCYTES NFR BLD AUTO: 23.7 %
MCH RBC QN AUTO: 29.9 PG (ref 26.5–33)
MCHC RBC AUTO-ENTMCNC: 33.1 G/DL (ref 31.5–36.5)
MCV RBC AUTO: 90 FL (ref 78–100)
MICROALBUMIN UR-MCNC: <5 MG/L
MICROALBUMIN/CREAT UR: NORMAL MG/G CR (ref 0–25)
MONOCYTES # BLD AUTO: 0.5 10E9/L (ref 0–1.3)
MONOCYTES NFR BLD AUTO: 8.6 %
NEUTROPHILS # BLD AUTO: 3.6 10E9/L (ref 1.6–8.3)
NEUTROPHILS NFR BLD AUTO: 64.1 %
NONHDLC SERPL-MCNC: 135 MG/DL
PLATELET # BLD AUTO: 232 10E9/L (ref 150–450)
POTASSIUM SERPL-SCNC: 4.1 MMOL/L (ref 3.4–5.3)
PROT SERPL-MCNC: 7.6 G/DL (ref 6.8–8.8)
RBC # BLD AUTO: 4.28 10E12/L (ref 3.8–5.2)
SODIUM SERPL-SCNC: 136 MMOL/L (ref 133–144)
TRIGL SERPL-MCNC: 104 MG/DL
WBC # BLD AUTO: 5.6 10E9/L (ref 4–11)

## 2020-10-19 PROCEDURE — 80061 LIPID PANEL: CPT | Performed by: FAMILY MEDICINE

## 2020-10-19 PROCEDURE — G0145 SCR C/V CYTO,THINLAYER,RESCR: HCPCS | Performed by: FAMILY MEDICINE

## 2020-10-19 PROCEDURE — 99396 PREV VISIT EST AGE 40-64: CPT | Mod: 25 | Performed by: FAMILY MEDICINE

## 2020-10-19 PROCEDURE — 80053 COMPREHEN METABOLIC PANEL: CPT | Performed by: FAMILY MEDICINE

## 2020-10-19 PROCEDURE — 36415 COLL VENOUS BLD VENIPUNCTURE: CPT | Performed by: FAMILY MEDICINE

## 2020-10-19 PROCEDURE — 87624 HPV HI-RISK TYP POOLED RSLT: CPT | Performed by: FAMILY MEDICINE

## 2020-10-19 PROCEDURE — 85025 COMPLETE CBC W/AUTO DIFF WBC: CPT | Performed by: FAMILY MEDICINE

## 2020-10-19 PROCEDURE — 82043 UR ALBUMIN QUANTITATIVE: CPT | Performed by: FAMILY MEDICINE

## 2020-10-19 PROCEDURE — 90471 IMMUNIZATION ADMIN: CPT | Performed by: FAMILY MEDICINE

## 2020-10-19 PROCEDURE — 90682 RIV4 VACC RECOMBINANT DNA IM: CPT | Performed by: FAMILY MEDICINE

## 2020-10-19 RX ORDER — LISINOPRIL AND HYDROCHLOROTHIAZIDE 20; 25 MG/1; MG/1
1 TABLET ORAL DAILY
Qty: 90 TABLET | Refills: 3 | Status: SHIPPED | OUTPATIENT
Start: 2020-10-19 | End: 2021-11-26

## 2020-10-19 ASSESSMENT — PATIENT HEALTH QUESTIONNAIRE - PHQ9
5. POOR APPETITE OR OVEREATING: NOT AT ALL
SUM OF ALL RESPONSES TO PHQ QUESTIONS 1-9: 0

## 2020-10-19 ASSESSMENT — ANXIETY QUESTIONNAIRES
2. NOT BEING ABLE TO STOP OR CONTROL WORRYING: NOT AT ALL
7. FEELING AFRAID AS IF SOMETHING AWFUL MIGHT HAPPEN: NOT AT ALL
GAD7 TOTAL SCORE: 0
5. BEING SO RESTLESS THAT IT IS HARD TO SIT STILL: NOT AT ALL
1. FEELING NERVOUS, ANXIOUS, OR ON EDGE: NOT AT ALL
6. BECOMING EASILY ANNOYED OR IRRITABLE: NOT AT ALL
3. WORRYING TOO MUCH ABOUT DIFFERENT THINGS: NOT AT ALL

## 2020-10-19 ASSESSMENT — MIFFLIN-ST. JEOR: SCORE: 1363.37

## 2020-10-19 NOTE — RESULT ENCOUNTER NOTE
Dear Sheri,  Your lab test showed normal  urine exam with no protein leak, this is good  Your liver and kidney functions, hemoglobin, blood counts are all in normal range.  These are reassuring.  Your fasting cholesterol is slightly elevated compared to the previous excellent numbers in the past, but not concerning enough for consideration of medications.  Please continue with your efforts on healthy eating, regular exercises  We will recheck your numbers next year.   Let me know if you have any questions. Take care.  Ary Saldivar MD

## 2020-10-19 NOTE — PATIENT INSTRUCTIONS
SCHEDULE FOR MAMMOGRAM  SCHEDULE FOR COLONOSCOPY  GETTHE FLU SHOT TODAY      Preventive Health Recommendations  Female Ages 50 - 64    Yearly exam: See your health care provider every year in order to  o Review health changes.   o Discuss preventive care.    o Review your medicines if your doctor has prescribed any.      Get a Pap test every three years (unless you have an abnormal result and your provider advises testing more often).    If you get Pap tests with HPV test, you only need to test every 5 years, unless you have an abnormal result.     You do not need a Pap test if your uterus was removed (hysterectomy) and you have not had cancer.    You should be tested each year for STDs (sexually transmitted diseases) if you're at risk.     Have a mammogram every 1 to 2 years.    Have a colonoscopy at age 50, or have a yearly FIT test (stool test). These exams screen for colon cancer.      Have a cholesterol test every 5 years, or more often if advised.    Have a diabetes test (fasting glucose) every three years. If you are at risk for diabetes, you should have this test more often.     If you are at risk for osteoporosis (brittle bone disease), think about having a bone density scan (DEXA).    Shots: Get a flu shot each year. Get a tetanus shot every 10 years.    Nutrition:     Eat at least 5 servings of fruits and vegetables each day.    Eat whole-grain bread, whole-wheat pasta and brown rice instead of white grains and rice.    Get adequate Calcium and Vitamin D.     Lifestyle    Exercise at least 150 minutes a week (30 minutes a day, 5 days a week). This will help you control your weight and prevent disease.    Limit alcohol to one drink per day.    No smoking.     Wear sunscreen to prevent skin cancer.     See your dentist every six months for an exam and cleaning.    See your eye doctor every 1 to 2 years.

## 2020-10-19 NOTE — PROGRESS NOTES
"   SUBJECTIVE:   CC: Sheri Montoya is an 55 year old woman who presents for preventive health visit.     {Split Bill scripting  The purpose of this visit is to discuss your medical history and prevent health problems before you are sick. You may be responsible for a co-pay, coinsurance, or deductible if your visit today includes services such as checking on a sore throat, having an x-ray or lab test, or treating and evaluating a new or existing condition :208603}  Patient has been advised of split billing requirements and indicates understanding: {Yes and No:721517}  Healthy Habits:    Do you get at least three servings of calcium containing foods daily (dairy, green leafy vegetables, etc.)? { :337294::\"yes\"}    Amount of exercise or daily activities, outside of work: { :884022}    Problems taking medications regularly { :111894::\"No\"}    Medication side effects: { :934707::\"No\"}    Have you had an eye exam in the past two years? { :154642}    Do you see a dentist twice per year? { :332461}    Do you have sleep apnea, excessive snoring or daytime drowsiness?{ :824567}  {Outside tests to abstract? :288310}    {additional problems to add (Optional):579713}    Today's PHQ-2 Score:   PHQ-2 ( 1999 Pfizer) 10/18/2020 4/1/2020   Q1: Little interest or pleasure in doing things 1 0   Q2: Feeling down, depressed or hopeless 1 0   PHQ-2 Score 2 0   Q1: Little interest or pleasure in doing things Several days -   Q2: Feeling down, depressed or hopeless Several days -   PHQ-2 Score 2 -     {PHQ-2 LOOK IN ASSESSMENTS (Optional) :757246}  Abuse: Current or Past(Physical, Sexual or Emotional)- {YES/NO/NA:575774}  Do you feel safe in your environment? {YES/NO/NA:468230}        Social History     Tobacco Use     Smoking status: Never Smoker     Smokeless tobacco: Never Used   Substance Use Topics     Alcohol use: Yes     Alcohol/week: 5.8 standard drinks     Types: 7 Standard drinks or equivalent per week     If you drink alcohol do " "you typically have >3 drinks per day or >7 drinks per week? {ETOH :680908}                     Reviewed orders with patient.  Reviewed health maintenance and updated orders accordingly - {Yes/No:263398::\"Yes\"}  {Chronicprobdata (Optional):434721}    {Mammo Decision Support (Optional):852178}    Pertinent mammograms are reviewed under the imaging tab.  History of abnormal Pap smear: {PAP HX:518010}  PAP / HPV Latest Ref Rng & Units 6/28/2017 6/10/2016 4/7/2015   PAP - NIL OTHER-NIL, See Result NIL   HPV 16 DNA NEG Negative Negative Negative   HPV 18 DNA NEG Negative Negative Negative   OTHER HR HPV NEG Negative Negative Negative     Reviewed and updated as needed this visit by clinical staff                 Reviewed and updated as needed this visit by Provider                {HISTORY OPTIONS (Optional):681157}    ROS:  { :359847}    OBJECTIVE:   There were no vitals taken for this visit.  EXAM:  {Exam Choices:433629}    {Diagnostic Test Results (Optional):082948::\"Diagnostic Test Results:\",\"Labs reviewed in Epic\"}    ASSESSMENT/PLAN:   {Diag Picklist:656072}    Patient has been advised of split billing requirements and indicates understanding: {YES / NO:700704::\"Yes\"}  COUNSELING:   {FEMALE COUNSELING MESSAGES:189969::\"Reviewed preventive health counseling, as reflected in patient instructions\"}    Estimated body mass index is 29.03 kg/m  as calculated from the following:    Height as of 4/1/20: 1.626 m (5' 4\").    Weight as of 4/1/20: 76.7 kg (169 lb 1.6 oz).    {Weight Management Plan (ACO) Complete if BMI is abnormal-  Ages 18-64  BMI >24.9.  Age 65+ with BMI <23 or >30 (Optional):063644}    She reports that she has never smoked. She has never used smokeless tobacco.      Counseling Resources:  ATP IV Guidelines  Pooled Cohorts Equation Calculator  Breast Cancer Risk Calculator  BRCA-Related Cancer Risk Assessment: FHS-7 Tool  FRAX Risk Assessment  ICSI Preventive Guidelines  Dietary Guidelines for Americans, " 2010  USDA's MyPlate  ASA Prophylaxis  Lung CA Screening    Ary Saldivar MD  [facility]

## 2020-10-19 NOTE — PROGRESS NOTES
SUBJECTIVE:   CC: Sheri Montoya is an 55 year old woman who presents for preventive health visit.       Patient has been advised of split billing requirements and indicates understanding: Yes  Healthy Habits:     Getting at least 3 servings of Calcium per day:  NO    Bi-annual eye exam:  Yes    Dental care twice a year:  Yes    Sleep apnea or symptoms of sleep apnea:  None    Diet:  Low salt    Frequency of exercise:  2-3 days/week    Duration of exercise:  15-30 minutes    Taking medications regularly:  Yes    Medication side effects:  Lightheadedness    PHQ-2 Total Score: 2    Additional concerns today:  No          Hypertension Follow-up      Do you check your blood pressure regularly outside of the clinic? No     Are you following a low salt diet? Yes    Are your blood pressures ever more than 140 on the top number (systolic) OR more   than 90 on the bottom number (diastolic), for example 140/90?  N/A      Today's PHQ-2 Score:   PHQ-2 ( 1999 Pfizer) 10/18/2020   Q1: Little interest or pleasure in doing things 1   Q2: Feeling down, depressed or hopeless 1   PHQ-2 Score 2   Q1: Little interest or pleasure in doing things Several days   Q2: Feeling down, depressed or hopeless Several days   PHQ-2 Score 2       Abuse: Current or Past (Physical, Sexual or Emotional) - No  Do you feel safe in your environment? Yes        Social History     Tobacco Use     Smoking status: Never Smoker     Smokeless tobacco: Never Used   Substance Use Topics     Alcohol use: Yes     Alcohol/week: 5.8 standard drinks     Types: 7 Standard drinks or equivalent per week     If you drink alcohol do you typically have >3 drinks per day or >7 drinks per week? No    Alcohol Use 10/19/2020   Prescreen: >3 drinks/day or >7 drinks/week? -   Prescreen: >3 drinks/day or >7 drinks/week? No   No flowsheet data found.    Reviewed orders with patient.  Reviewed health maintenance and updated orders accordingly - Yes  Lab work is in process  Labs  reviewed in EPIC  BP Readings from Last 3 Encounters:   10/19/20 134/83   04/01/20 135/84   12/30/19 128/80    Wt Readings from Last 3 Encounters:   10/19/20 77.8 kg (171 lb 9.6 oz)   04/01/20 76.7 kg (169 lb 1.6 oz)   12/11/19 79.4 kg (175 lb)                  Patient Active Problem List   Diagnosis     CARDIOVASCULAR SCREENING; LDL GOAL LESS THAN 160     Family history of breast cancer     Overweight (BMI 25.0-29.9)     ASCUS with positive high risk HPV cervical     Benign essential hypertension     Tubulovillous adenoma of colon     Elevated ALT measurement     Past Surgical History:   Procedure Laterality Date     COLONOSCOPY N/A 9/18/2017    Procedure: COMBINED COLONOSCOPY, SINGLE OR MULTIPLE BIOPSY/POLYPECTOMY BY BIOPSY;;  Surgeon: Milan Cameron MD;  Location: [facility] OR     COLONOSCOPY WITH CO2 INSUFFLATION N/A 9/18/2017    Procedure: COLONOSCOPY WITH CO2 INSUFFLATION;  Screening for colon cancer  Routine general medical examination at a health care facility  bmi 28.19  Pharmacy: [facility] [employer] [facility] [employer]  ;  Surgeon: Milan Cameron MD;  Location: [facility] OR     CYSTECTOMY PILONIDAL  1996     HC TOOTH EXTRACTION W/FORCEP  2002?     SURGICAL HISTORY OF -       infertility workup-hysteroscope        Social History     Tobacco Use     Smoking status: Never Smoker     Smokeless tobacco: Never Used   Substance Use Topics     Alcohol use: Yes     Alcohol/week: 5.8 standard drinks     Types: 7 Standard drinks or equivalent per week     Family History   Problem Relation Age of Onset     Hypertension Father      Lipids Father      Diabetes Maternal Grandfather      Cerebrovascular Disease Maternal Grandmother      Breast Cancer Mother      Cancer Mother 73        tumors      C.A.D. Paternal Uncle      Asthma No family hx of      Cancer - colorectal No family hx of      Prostate Cancer No family hx of          Current Outpatient Medications   Medication Sig Dispense Refill     lisinopril-hydrochlorothiazide (ZESTORETIC) 20-25 MG  tablet Take 1 tablet by mouth daily Profile Rx 90 tablet 3     Multiple Vitamins-Minerals (DAILY MULTIVITAMIN PO) Take 1 tablet by mouth daily       clindamycin (CLEOCIN T) 1 % external lotion Apply daily to affected areas after showering. 60 mL 11     No Known Allergies  Recent Labs   Lab Test 10/19/20  0951 12/11/19  1044 11/21/18  1319 06/28/17  1023   * 80 81 70   HDL 68 61 76 76   TRIG 104 75 149 74   ALT 21 52* 20 27   CR 0.70 0.82 0.74 0.79   GFRESTIMATED >90 82 83 77   GFRESTBLACK >90 >90 >90 >90  African American GFR Calc     POTASSIUM 4.1 3.5 3.9 3.7   TSH  --  1.21  --  1.80        Mammogram Screening: Patient over age 50, mutual decision to screen reflected in health maintenance.    Pertinent mammograms are reviewed under the imaging tab.  History of abnormal Pap smear: NO - age 30- 65 PAP every 3 years recommended  YES - updated in Problem List and Health Maintenance accordingly  PAP / HPV Latest Ref Rng & Units 6/28/2017 6/10/2016 4/7/2015   PAP - NIL OTHER-NIL, See Result NIL   HPV 16 DNA NEG Negative Negative Negative   HPV 18 DNA NEG Negative Negative Negative   OTHER HR HPV NEG Negative Negative Negative     Reviewed and updated as needed this visit by clinical staff  Tobacco  Allergies  Meds   Med Hx  Surg Hx  Fam Hx  Soc Hx        Reviewed and updated as needed this visit by Provider                  Past Medical History:   Diagnosis Date     ASCUS with positive high risk HPV 7/23/14    + HR HPV 52     Breast cyst      Chicken pox      H/O colposcopy with cervical biopsy 9/2014    Neg for dysplasia     Infertility     s/p 3 rounds of IVF. Was pregnant only very briefly     Obesity      Positive TB test     Has been on INH Janurary 2012      Past Surgical History:   Procedure Laterality Date     COLONOSCOPY N/A 9/18/2017    Procedure: COMBINED COLONOSCOPY, SINGLE OR MULTIPLE BIOPSY/POLYPECTOMY BY BIOPSY;;  Surgeon: Milan Cameron MD;  Location: [facility]     COLONOSCOPY WITH CO2  "INSUFFLATION N/A 2017    Procedure: COLONOSCOPY WITH CO2 INSUFFLATION;  Screening for colon cancer  Routine general medical examination at a health care facility  bmi 28.19  Pharmacy: [facility] [place]  ;  Surgeon: Milan Cameron MD;  Location: [facility] OR     CYSTECTOMY PILONIDAL       HC TOOTH EXTRACTION W/FORCEP  ?     SURGICAL HISTORY OF -       infertility workup-hysteroscope      OB History    Para Term  AB Living   0 0 0 0 0 0   SAB TAB Ectopic Multiple Live Births   0 0 0 0 0       Review of Systems  CONSTITUTIONAL: NEGATIVE for fever, chills, change in weight  INTEGUMENTARY/SKIN: NEGATIVE for worrisome rashes, moles or lesions  EYES: NEGATIVE for vision changes or irritation  ENT: NEGATIVE for ear, mouth and throat problems  RESP: NEGATIVE for significant cough or SOB  BREAST: NEGATIVE for masses, tenderness or discharge  CV: NEGATIVE for chest pain, palpitations or peripheral edema  CV: Hx HTN  GI: NEGATIVE for nausea, abdominal pain, heartburn, or change in bowel habits  : NEGATIVE for unusual urinary or vaginal symptoms. No vaginal bleeding.  MUSCULOSKELETAL: NEGATIVE for significant arthralgias or myalgia  NEURO: NEGATIVE for weakness, dizziness or paresthesias  ENDOCRINE: NEGATIVE for temperature intolerance, skin/hair changes  HEME/ALLERGY/IMMUNE: NEGATIVE for bleeding problems  PSYCHIATRIC: NEGATIVE for changes in mood or affect      OBJECTIVE:   /83 (BP Location: Right arm, Patient Position: Sitting, Cuff Size: Adult Regular)   Pulse 85   Temp 97.6  F (36.4  C) (Temporal)   Ht 1.626 m (5' 4\")   Wt 77.8 kg (171 lb 9.6 oz)   SpO2 100%   BMI 29.46 kg/m    Physical Exam  GENERAL APPEARANCE: healthy, alert and no distress  EYES: Eyes grossly normal to inspection, PERRL and conjunctivae and sclerae normal  HENT: ear canals and TM's normal, nose and mouth without ulcers or lesions, oropharynx clear and oral mucous membranes moist  NECK: no adenopathy, no asymmetry, " masses, or scars and thyroid normal to palpation  RESP: lungs clear to auscultation - no rales, rhonchi or wheezes  BREAST: normal without masses, tenderness or nipple discharge and no palpable axillary masses or adenopathy  CV: regular rate and rhythm, normal S1 S2, no S3 or S4, no murmur, click or rub, no peripheral edema and peripheral pulses strong  ABDOMEN: soft, nontender, no hepatosplenomegaly, no masses and bowel sounds normal   (female): normal female external genitalia, normal urethral meatus, vaginal mucosal atrophy noted, normal cervix, adnexae, and uterus without masses or abnormal discharge  MS: no musculoskeletal defects are noted and gait is age appropriate without ataxia  SKIN: no suspicious lesions or rashes  NEURO: Normal strength and tone, sensory exam grossly normal, mentation intact and speech normal  PSYCH: mentation appears normal and affect normal/bright    Diagnostic Test Results:  Labs reviewed in Epic    ASSESSMENT/PLAN:   1. Routine general medical examination at a health care facility  Discussed on regular exercises, daily calcium intake, healthy eating, self breast exams monthly and routine dental checks    - ADMIN 1st VACCINE  - C RIV4 (FLUBLOK) VACCINE RECOMBINANT DNA PRSRV ANTIBIO FREE, IM [[id number]]  - MA Screening Digital Bilateral; Future  - Pap imaged thin layer screen with HPV - recommended age 30 - 65 years (select HPV order below)  - HPV High Risk Types DNA Cervical  - GASTROENTEROLOGY ADULT REF PROCEDURE ONLY; Future    2. Benign essential hypertension  BP Readings from Last 6 Encounters:   10/19/20 134/83   04/01/20 135/84   12/30/19 128/80   12/11/19 (!) 142/98   12/04/19 (!) 149/92   02/06/19 142/89     Blood pressure is at goal, continue with current medications, low-salt diet, regular exercises, recheck in 1 year or sooner if needed.  - lisinopril-hydrochlorothiazide (ZESTORETIC) 20-25 MG tablet; Take 1 tablet by mouth daily Profile Rx  Dispense: 90 tablet; Refill:  3    3. Family history of breast cancer    - MA Screening Digital Bilateral; Future    4. Colon cancer screening  Tubular adenoma of colon in 2017  - GASTROENTEROLOGY ADULT REF PROCEDURE ONLY; Future    5. Cervical cancer screening    - Pap imaged thin layer screen with HPV - recommended age 30 - 65 years (select HPV order below)  - HPV High Risk Types DNA Cervical    6. Need for shingles vaccine  Recommended  patient to have it done at the pharmacy after checking with insurance for coverage.      7. CARDIOVASCULAR SCREENING; LDL GOAL LESS THAN 160  LDL Cholesterol Calculated   Date Value Ref Range Status   10/19/2020 114 (H) <100 mg/dL Final     Comment:     Above desirable:  100-129 mg/dl  Borderline High:  130-159 mg/dL  High:             160-189 mg/dL  Very high:       >189 mg/dl       The 10-year ASCVD risk score (Derek STRICKLAND Jr., et al., 2013) is: 2.2%    Values used to calculate the score:      Age: 55 years      Sex: Female      Is Non- : No      Diabetic: No      Tobacco smoker: No      Systolic Blood Pressure: 134 mmHg      Is BP treated: Yes      HDL Cholesterol: 68 mg/dL      Total Cholesterol: 203 mg/dL      8. Tubulovillous adenoma of colon  Recheck colonoscopy this year  - GASTROENTEROLOGY ADULT REF PROCEDURE ONLY; Future    9. Encounter for screening mammogram for malignant neoplasm of breast    - MA Screening Digital Bilateral; Future    10. Advance care planning  Patient has brought copies of ACP to have it in file      Patient has been advised of split billing requirements and indicates understanding: Yes  COUNSELING:  Reviewed preventive health counseling, as reflected in patient instructions  Special attention given to:        Regular exercise       Healthy diet/nutrition       Vision screening       Immunizations    Vaccinated for: Influenza             Osteoporosis Prevention/Bone Health       Colon cancer screening       ([place])menopause management       The 10-year ASCVD  "risk score (Derek STRICKLAND Jr., et al., 2013) is: 2.2%    Values used to calculate the score:      Age: 55 years      Sex: Female      Is Non- : No      Diabetic: No      Tobacco smoker: No      Systolic Blood Pressure: 134 mmHg      Is BP treated: Yes      HDL Cholesterol: 68 mg/dL      Total Cholesterol: 203 mg/dL       Advance Care Planning    Estimated body mass index is 29.46 kg/m  as calculated from the following:    Height as of this encounter: 1.626 m (5' 4\").    Weight as of this encounter: 77.8 kg (171 lb 9.6 oz).    Weight management plan: Discussed healthy diet and exercise guidelines    She reports that she has never smoked. She has never used smokeless tobacco.      Counseling Resources:  ATP IV Guidelines  Pooled Cohorts Equation Calculator  Breast Cancer Risk Calculator  BRCA-Related Cancer Risk Assessment: FHS-7 Tool  FRAX Risk Assessment  ICSI Preventive Guidelines  Dietary Guidelines for Americans, 2010  USDA's MyPlate  ASA Prophylaxis  Lung CA Screening    Ary Saldivar MD  [facility]  Chart documentation done in part with Dragon Voice recognition Software. Although reviewed after completion, some word and grammatical error may remain.    "

## 2020-10-20 ASSESSMENT — ANXIETY QUESTIONNAIRES: GAD7 TOTAL SCORE: 0

## 2020-10-22 LAB
COPATH REPORT: NORMAL
PAP: NORMAL

## 2020-10-23 LAB
FINAL DIAGNOSIS: NORMAL
HPV HR 12 DNA CVX QL NAA+PROBE: NEGATIVE
HPV16 DNA SPEC QL NAA+PROBE: NEGATIVE
HPV18 DNA SPEC QL NAA+PROBE: NEGATIVE
SPECIMEN DESCRIPTION: NORMAL
SPECIMEN SOURCE CVX/VAG CYTO: NORMAL

## 2020-11-18 ENCOUNTER — DOCUMENTATION ONLY (OUTPATIENT)
Dept: OTHER | Facility: CLINIC | Age: 55
End: 2020-11-18

## 2020-11-29 ENCOUNTER — DOCUMENTATION ONLY (OUTPATIENT)
Dept: PEDIATRICS | Facility: CLINIC | Age: 55
End: 2020-11-29

## 2020-11-29 DIAGNOSIS — Z11.59 SPECIAL SCREENING EXAMINATION FOR VIRAL DISEASE: Primary | ICD-10-CM

## 2020-11-29 NOTE — PROGRESS NOTES
This patient has a lab only appointment on 12/5/2020 (for Covid PCR) but does not have future orders. Please review, associate diagnosis and sign pending lab orders for the upcoming appointment.  Colonoscopy is scheduled for December 9,2020.    Thank you,    Alistair Sullivan MLT ([facility])  [facility]

## 2020-12-02 RX ORDER — SODIUM PICOSULFATE, MAGNESIUM OXIDE, AND ANHYDROUS CITRIC ACID 10; 3.5; 12 MG/160ML; G/160ML; G/160ML
1 LIQUID ORAL SEE ADMIN INSTRUCTIONS
Qty: 2 BOTTLE | Refills: 0 | Status: SHIPPED | OUTPATIENT
Start: 2020-12-02 | End: 2021-12-07

## 2020-12-02 ASSESSMENT — MIFFLIN-ST. JEOR: SCORE: 1351.58

## 2020-12-04 ENCOUNTER — NURSE TRIAGE (OUTPATIENT)
Dept: NURSING | Facility: CLINIC | Age: 55
End: 2020-12-04

## 2020-12-05 DIAGNOSIS — Z11.59 SPECIAL SCREENING EXAMINATION FOR VIRAL DISEASE: ICD-10-CM

## 2020-12-05 PROCEDURE — U0003 INFECTIOUS AGENT DETECTION BY NUCLEIC ACID (DNA OR RNA); SEVERE ACUTE RESPIRATORY SYNDROME CORONAVIRUS 2 (SARS-COV-2) (CORONAVIRUS DISEASE [COVID-19]), AMPLIFIED PROBE TECHNIQUE, MAKING USE OF HIGH THROUGHPUT TECHNOLOGIES AS DESCRIBED BY CMS-2020-01-R: HCPCS | Performed by: SPECIALIST

## 2020-12-05 NOTE — TELEPHONE ENCOUNTER
Colonoscopy is Wednesday 12-9-20. Golytely is on back order per pharmacy, and patient will need to use Miralax with Gatorade for her bowl prep.      Patient said she spoke to the surgery team on call who gave the ok, but she will need to get specific instructions from the clinic or radiology Monday morning.      Lydia Joy RN  [facility]        Reason for Disposition    Caller has NON-URGENT medication question about med that PCP prescribed and triager unable to answer question    Protocols used: MEDICATION QUESTION CALL-A-AH

## 2020-12-06 LAB
SARS-COV-2 RNA SPEC QL NAA+PROBE: NOT DETECTED
SPECIMEN SOURCE: NORMAL

## 2020-12-09 ENCOUNTER — HOSPITAL ENCOUNTER (OUTPATIENT)
Facility: AMBULATORY SURGERY CENTER | Age: 55
Discharge: HOME | End: 2020-12-09
Attending: SPECIALIST | Admitting: SPECIALIST
Payer: COMMERCIAL

## 2020-12-09 VITALS
RESPIRATION RATE: 16 BRPM | BODY MASS INDEX: 28.85 KG/M2 | HEART RATE: 71 BPM | TEMPERATURE: 97.8 F | OXYGEN SATURATION: 100 % | SYSTOLIC BLOOD PRESSURE: 112 MMHG | DIASTOLIC BLOOD PRESSURE: 68 MMHG | HEIGHT: 64 IN | WEIGHT: 169 LBS

## 2020-12-09 DIAGNOSIS — Z12.11 SPECIAL SCREENING FOR MALIGNANT NEOPLASMS, COLON: Primary | ICD-10-CM

## 2020-12-09 LAB — COLONOSCOPY: NORMAL

## 2020-12-09 PROCEDURE — 88305 TISSUE EXAM BY PATHOLOGIST: CPT | Performed by: PATHOLOGY

## 2020-12-09 PROCEDURE — G8907 PT DOC NO EVENTS ON DISCHARG: HCPCS

## 2020-12-09 PROCEDURE — G8918 PT W/O PREOP ORDER IV AB PRO: HCPCS

## 2020-12-09 PROCEDURE — 45385 COLONOSCOPY W/LESION REMOVAL: CPT

## 2020-12-09 RX ORDER — LIDOCAINE 40 MG/G
CREAM TOPICAL
Status: DISCONTINUED | OUTPATIENT
Start: 2020-12-09 | End: 2020-12-10 | Stop reason: HOSPADM

## 2020-12-09 RX ORDER — FENTANYL CITRATE 50 UG/ML
INJECTION, SOLUTION INTRAMUSCULAR; INTRAVENOUS PRN
Status: DISCONTINUED | OUTPATIENT
Start: 2020-12-09 | End: 2020-12-09 | Stop reason: HOSPADM

## 2020-12-09 RX ORDER — ONDANSETRON 2 MG/ML
4 INJECTION INTRAMUSCULAR; INTRAVENOUS
Status: DISCONTINUED | OUTPATIENT
Start: 2020-12-09 | End: 2020-12-10 | Stop reason: HOSPADM

## 2020-12-09 NOTE — H&P
Pre-Endoscopy History and Physical     Sheri Montoya MRN# [id number]   YOB: 1965 Age: 55 year old     Date of Procedure: 12/9/2020  Primary care provider: Ary Saldivar  Type of Endoscopy: Colonoscopy with possible biopsy, possible polypectomy  Reason for Procedure: history of polyps  Type of Anesthesia Anticipated: Conscious Sedation    HPI:    Sheri is a 55 year old female who will be undergoing the above procedure.      A history and physical has been performed. The patient's medications and allergies have been reviewed. The risks and benefits of the procedure and the sedation options and risks were discussed with the patient.  All questions were answered and informed consent was obtained.      She denies a personal or family history of anesthesia complications or bleeding disorders.     Patient Active Problem List   Diagnosis     CARDIOVASCULAR SCREENING; LDL GOAL LESS THAN 160     Family history of breast cancer     Overweight (BMI 25.0-29.9)     ASCUS with positive high risk HPV cervical     Benign essential hypertension     Tubulovillous adenoma of colon     Elevated ALT measurement        Past Medical History:   Diagnosis Date     ASCUS with positive high risk HPV 7/23/14    + HR HPV 52     Breast cyst      Chicken pox      H/O colposcopy with cervical biopsy 9/2014    Neg for dysplasia     Infertility     s/p 3 rounds of IVF. Was pregnant only very briefly     Obesity      Positive TB test     Has been on INH Janurary 2012        Past Surgical History:   Procedure Laterality Date     COLONOSCOPY N/A 9/18/2017    Procedure: COMBINED COLONOSCOPY, SINGLE OR MULTIPLE BIOPSY/POLYPECTOMY BY BIOPSY;;  Surgeon: Milan Cameron MD;  Location: [facility] OR     COLONOSCOPY WITH CO2 INSUFFLATION N/A 9/18/2017    Procedure: COLONOSCOPY WITH CO2 INSUFFLATION;  Screening for colon cancer  Routine general medical examination at a health care facility  bmi 28.19  [facility]  ;  Surgeon:  Milan Cameron MD;  Location: [facility] OR     CYSTECTOMY PILONIDAL  1996      TOOTH EXTRACTION W/FORCEP  2002?     SURGICAL HISTORY OF -       infertility workup-hysteroscope        Social History     Tobacco Use     Smoking status: Never Smoker     Smokeless tobacco: Never Used   Substance Use Topics     Alcohol use: Yes     Alcohol/week: 5.8 standard drinks     Types: 7 Standard drinks or equivalent per week     Comment: 1-2 drinks per day        Family History   Problem Relation Age of Onset     Hypertension Father      Lipids Father      Diabetes Maternal Grandfather      Cerebrovascular Disease Maternal Grandmother      Breast Cancer Mother      Cancer Mother 73        tumors      C.A.D. Paternal Uncle      Asthma No family hx of      Cancer - colorectal No family hx of      Prostate Cancer No family hx of        Prior to Admission medications    Medication Sig Start Date End Date Taking? Authorizing Provider   lisinopril-hydrochlorothiazide (ZESTORETIC) 20-25 MG tablet Take 1 tablet by mouth daily Profile Rx 10/19/20  Yes Ary Saldivar MD   Multiple Vitamins-Minerals (DAILY MULTIVITAMIN PO) Take 1 tablet by mouth daily 6/28/17  Yes Reported, Patient   polyethylene glycol (GOLYTELY) 236 g suspension Take 4,000 mLs by mouth See Admin Instructions The following are available over the counter:  1. One ten-ounce bottle of Citrate of Magnesium; Take at 7 pm, two days before you procedure.  2.  1 oz simethicone solution (40 mg/0.6 ml)  3.  Prescription 1 4-liter container.  In the evening before your procedure, fill the container with water to 4-liter fill line (if you purchased NuLytely, first add the contents of the flavor powder).  After capping the container, shake vigorously several times.  Do not refrigerate.  Sig: Follow split dose (2-Day) regimen:  At 6 pm before before your procedure:  Drink 8 oz. (240 ml) every 10 minutes, until 2 liters are consumed.  In the AM of you procedure: Add 1 teaspoon of  "simethicone (40 mg/0.6 ml) to the remaining 2 L prep.  Starting five (5) hours before your procedure, drink 8 oz. (240 ml) every 10 minutes until the remaining 2 liters are consumed.  Complete this step at least 2 hours before your arrival time. 12/2/20  Yes Milan Cameron MD   Sod Picosulfate-Mag Ox-Cit Acd (CLENPIQ) 10-3.5-12 MG-GM -GM/160ML SOLN Take 1 Bottle by mouth See Admin Instructions Follow Spit Dose (2-Day) Regimen: Day1: The evening before your procedure: Drink on 5.4 oz bottle. Then drink at least 5- 8 oz cups of water within 5 hours.  Day 2: The morning of your procedure:  Starting 5 hours before your colonoscopy, drink the contents of the other 5.4 oz bottle.  Then drink at least 3 (three)- 8 oz cups of water, add 1 teaspoon of simethicone (40 mg/0.6 ml) to the second glass of water.  Complete this at least 2 hours before you arrival time. 12/2/20  Yes Milan Cameron MD       No Known Allergies     REVIEW OF SYSTEMS:   5 point ROS negative except as noted above in HPI, including Gen., Resp., CV, GI &  system review.    PHYSICAL EXAM:   /84   Temp 97.1  F (36.2  C) (Temporal)   Resp 16   Ht 1.626 m (5' 4\")   Wt 76.7 kg (169 lb)   SpO2 100%   BMI 29.01 kg/m   Estimated body mass index is 29.01 kg/m  as calculated from the following:    Height as of this encounter: 1.626 m (5' 4\").    Weight as of this encounter: 76.7 kg (169 lb).   GENERAL APPEARANCE: alert, and oriented  MENTAL STATUS: alert  AIRWAY EXAM: Mallampatti Class I (visualization of the soft palate, fauces, uvula, anterior and posterior pillars)  RESP: lungs clear to auscultation - no rales, rhonchi or wheezes  CV: regular rates and rhythm  DIAGNOSTICS:    Not indicated    IMPRESSION   ASA Class 2 - Mild systemic disease    PLAN:   Plan for Colonoscopy with possible biopsy, possible polypectomy. We discussed the risks, benefits and alternatives and the patient wished to proceed.    The above has been forwarded to the " consulting provider.      Signed Electronically by: Milan Cameron MD  December 9, 2020

## 2020-12-14 LAB — COPATH REPORT: NORMAL

## 2020-12-15 NOTE — RESULT ENCOUNTER NOTE
Assessment: Adenomatous polyps are benign, but have malignant potential. This increases the risk of colon cancer and impacts the frequency of colonoscopy. Based on the updated polypectomy surveillance recommendations, individuals with 1-2 adenomas <10 mm on subsequent surveillance colonoscopy with a tubulovillous adenoma on the baseline colonoscopy should undergo their next surveillance colonoscopy in 5 years.     Recommendations: Surveillance colonoscopy in 5 years (2025).

## 2021-02-14 ENCOUNTER — HEALTH MAINTENANCE LETTER (OUTPATIENT)
Age: 56
End: 2021-02-14

## 2021-04-28 ENCOUNTER — IMMUNIZATION (OUTPATIENT)
Dept: NURSING | Facility: CLINIC | Age: 56
End: 2021-04-28
Payer: COMMERCIAL

## 2021-04-28 PROCEDURE — 0001A PR COVID VAC PFIZER DIL RECON 30 MCG/0.3 ML IM: CPT

## 2021-04-28 PROCEDURE — 91300 PR COVID VAC PFIZER DIL RECON 30 MCG/0.3 ML IM: CPT

## 2021-05-18 DIAGNOSIS — Z00.00 ROUTINE GENERAL MEDICAL EXAMINATION AT A HEALTH CARE FACILITY: ICD-10-CM

## 2021-05-18 DIAGNOSIS — Z12.31 ENCOUNTER FOR SCREENING MAMMOGRAM FOR MALIGNANT NEOPLASM OF BREAST: ICD-10-CM

## 2021-05-18 DIAGNOSIS — Z80.3 FAMILY HISTORY OF BREAST CANCER: ICD-10-CM

## 2021-05-18 PROCEDURE — 77067 SCR MAMMO BI INCL CAD: CPT | Mod: GC | Performed by: RADIOLOGY

## 2021-05-19 ENCOUNTER — IMMUNIZATION (OUTPATIENT)
Dept: NURSING | Facility: CLINIC | Age: 56
End: 2021-05-19
Attending: INTERNAL MEDICINE
Payer: COMMERCIAL

## 2021-05-19 PROCEDURE — 0002A PR COVID VAC PFIZER DIL RECON 30 MCG/0.3 ML IM: CPT

## 2021-05-19 PROCEDURE — 91300 PR COVID VAC PFIZER DIL RECON 30 MCG/0.3 ML IM: CPT

## 2021-09-19 ENCOUNTER — HEALTH MAINTENANCE LETTER (OUTPATIENT)
Age: 56
End: 2021-09-19

## 2021-11-14 ENCOUNTER — HEALTH MAINTENANCE LETTER (OUTPATIENT)
Age: 56
End: 2021-11-14

## 2021-12-05 NOTE — PROGRESS NOTES
SUBJECTIVE:   CC: Sheri Montoya is an 56 year old woman who presents for preventive health visit.       Patient has been advised of split billing requirements and indicates understanding: Yes  Healthy Habits:     Getting at least 3 servings of Calcium per day:  NO    Bi-annual eye exam:  Yes    Dental care twice a year:  Yes    Sleep apnea or symptoms of sleep apnea:  None    Diet:  Regular (no restrictions)    Frequency of exercise:  2-3 days/week    Duration of exercise:  30-45 minutes    Taking medications regularly:  Yes    Medication side effects:  None    PHQ-2 Total Score: 0    Additional concerns today:  No          Shingles, flu, and covid booster questions      Today's PHQ-2 Score:   PHQ-2 ( 1999 Pfizer) 12/6/2021   Q1: Little interest or pleasure in doing things 0   Q2: Feeling down, depressed or hopeless 0   PHQ-2 Score 0   PHQ-2 Total Score (12-17 Years)- Positive if 3 or more points; Administer PHQ-A if positive -   Q1: Little interest or pleasure in doing things Not at all   Q2: Feeling down, depressed or hopeless Not at all   PHQ-2 Score 0       Abuse: Current or Past (Physical, Sexual or Emotional) - No  Do you feel safe in your environment? Yes        Social History     Tobacco Use     Smoking status: Never Smoker     Smokeless tobacco: Never Used   Substance Use Topics     Alcohol use: Yes     Alcohol/week: 5.8 standard drinks     Types: 7 Standard drinks or equivalent per week     Comment: 1-2 drinks per day      If you drink alcohol do you typically have >3 drinks per day or >7 drinks per week? No    Alcohol Use 12/7/2021   Prescreen: >3 drinks/day or >7 drinks/week? -   Prescreen: >3 drinks/day or >7 drinks/week? No   No flowsheet data found.    Reviewed orders with patient.  Reviewed health maintenance and updated orders accordingly - Yes  Lab work is in process  Labs reviewed in EPIC  BP Readings from Last 3 Encounters:   12/07/21 120/76   12/09/20 112/68   10/19/20 134/83    Wt  Readings from Last 3 Encounters:   12/07/21 79.8 kg (175 lb 14.4 oz)   12/02/20 76.7 kg (169 lb)   10/19/20 77.8 kg (171 lb 9.6 oz)                  Patient Active Problem List   Diagnosis     CARDIOVASCULAR SCREENING; LDL GOAL LESS THAN 160     Family history of breast cancer     Overweight (BMI 25.0-29.9)     ASCUS with positive high risk HPV cervical     Benign essential hypertension     Tubular adenoma of colon     Elevated ALT measurement     Past Surgical History:   Procedure Laterality Date     COLONOSCOPY N/A 9/18/2017    Procedure: COMBINED COLONOSCOPY, SINGLE OR MULTIPLE BIOPSY/POLYPECTOMY BY BIOPSY;;  Surgeon: Milan Cameron MD;  Location: [facility]     COLONOSCOPY WITH CO2 INSUFFLATION N/A 9/18/2017    Procedure: COLONOSCOPY WITH CO2 INSUFFLATION;  Screening for colon cancer  Routine general medical examination at a [facility] facility  bmi 28.19  Pharmacy: [facility] - [place]  ;  Surgeon: Milan Cameron MD;  Location: [facility] OR     COLONOSCOPY WITH CO2 INSUFFLATION N/A 12/9/2020    Procedure: COLONOSCOPY, WITH CO2 INSUFFLATION;  Surgeon: Milan Cameron MD;  Location: [facility]     CYSTECTOMY PILONIDAL  1996     HC TOOTH EXTRACTION W/FORCEP  2002?     SURGICAL HISTORY OF -       infertility workup-hysteroscope        Social History     Tobacco Use     Smoking status: Never Smoker     Smokeless tobacco: Never Used   Substance Use Topics     Alcohol use: Yes     Alcohol/week: 5.8 standard drinks     Types: 7 Standard drinks or equivalent per week     Comment: 1-2 drinks per day      Family History   Problem Relation Age of Onset     Hypertension Father      Lipids Father      Diabetes Maternal Grandfather      Cerebrovascular Disease Maternal Grandmother      Breast Cancer Mother      Cancer Mother 73        tumors      C.A.D. Paternal Uncle      Asthma No family hx of      Cancer - colorectal No family hx of      Prostate Cancer No family hx of          Current Outpatient Medications   Medication Sig Dispense  Refill     lisinopril-hydrochlorothiazide (ZESTORETIC) 20-25 MG tablet Take 1 tablet by mouth daily 90 tablet 3     Multiple Vitamins-Minerals (DAILY MULTIVITAMIN PO) Take 1 tablet by mouth daily       No Known Allergies  Recent Labs   Lab Test 10/19/20  0951 12/11/19  1044 11/21/18  1319 06/28/17  1023   * 80 81 70   HDL 68 61 76 76   TRIG 104 75 149 74   ALT 21 52* 20 27   CR 0.70 0.82 0.74 0.79   GFRESTIMATED >90 82 83 77   GFRESTBLACK >90 >90 >90 >90  African American GFR Calc     POTASSIUM 4.1 3.5 3.9 3.7   TSH  --  1.21  --  1.80        Breast Cancer Screening:  Any new diagnosis of family breast, ovarian, or bowel cancer? No    FHS-7:   Breast CA Risk Assessment (FHS-7) 12/6/2021   Did any of your first-degree relatives have breast or ovarian cancer? Yes   Did any of your relatives have bilateral breast cancer? Unknown   Did any man in your family have breast cancer? No   Did any woman in your family have breast and ovarian cancer? No   Did any woman in your family have breast cancer before age 50 y? No   Do you have 2 or more relatives with breast and/or ovarian cancer? Yes   Do you have 2 or more relatives with breast and/or bowel cancer? No     click delete button to remove this line now  Mammogram Screening: Recommended mammography every 1-2 years with patient discussion and risk factor consideration  Pertinent mammograms are reviewed under the imaging tab.    History of abnormal Pap smear: NO - age 30-65 PAP every 5 years with negative HPV co-testing recommended  PAP / HPV Latest Ref Rng & Units 10/19/2020 6/28/2017 6/10/2016   PAP (Historical) - NIL NIL OTHER-NIL, See Result   HPV16 NEG:Negative Negative Negative Negative   HPV18 NEG:Negative Negative Negative Negative   HRHPV NEG:Negative Negative Negative Negative     Reviewed and updated as needed this visit by clinical staff  Tobacco  Allergies  Meds   Med Hx  Surg Hx  Fam Hx  Soc Hx       Reviewed and updated as needed this visit by  Provider                 Past Medical History:   Diagnosis Date     ASCUS with positive high risk HPV 14    + HR HPV 52     Breast cyst      Chicken pox      H/O colposcopy with cervical biopsy 2014    Neg for dysplasia     Infertility     s/p 3 rounds of IVF. Was pregnant only very briefly     Obesity      Positive TB test     Has been on INH urary       Past Surgical History:   Procedure Laterality Date     COLONOSCOPY N/A 2017    Procedure: COMBINED COLONOSCOPY, SINGLE OR MULTIPLE BIOPSY/POLYPECTOMY BY BIOPSY;;  Surgeon: Milan Cameron MD;  Location: [facility]     COLONOSCOPY WITH CO2 INSUFFLATION N/A 2017    Procedure: COLONOSCOPY WITH CO2 INSUFFLATION;  Screening for colon cancer  Routine general medical examination at a health care facility  bmi 28.19  Pharmacy: [facility] - [place]  ;  Surgeon: Milan Cameron MD;  Location: [facility]     COLONOSCOPY WITH CO2 INSUFFLATION N/A 2020    Procedure: COLONOSCOPY, WITH CO2 INSUFFLATION;  Surgeon: Milan Cameron MD;  Location: [facility]     CYSTECTOMY PILONIDAL       HC TOOTH EXTRACTION W/FORCEP  ?     SURGICAL HISTORY OF -       infertility workup-hysteroscope      OB History    Para Term  AB Living   0 0 0 0 0 0   SAB IAB Ectopic Multiple Live Births   0 0 0 0 0       Review of Systems   Constitutional: Negative for chills and fever.   HENT: Negative for congestion, ear pain, hearing loss and sore throat.    Eyes: Negative for pain and visual disturbance.   Respiratory: Negative for cough and shortness of breath.    Cardiovascular: Negative for chest pain, palpitations and peripheral edema.   Gastrointestinal: Negative for abdominal pain, constipation, diarrhea, heartburn, hematochezia and nausea.   Breasts:  Negative for tenderness, breast mass and discharge.   Genitourinary: Negative for dysuria, frequency, genital sores, hematuria, pelvic pain, urgency, vaginal bleeding and vaginal discharge.   Musculoskeletal: Negative  "for arthralgias, joint swelling and myalgias.   Skin: Negative for rash.   Neurological: Negative for dizziness, weakness, headaches and paresthesias.   Psychiatric/Behavioral: Negative for mood changes. The patient is not nervous/anxious.      CONSTITUTIONAL: NEGATIVE for fever, chills, change in weight  INTEGUMENTARY/SKIN: NEGATIVE for worrisome rashes, moles or lesions  EYES: NEGATIVE for vision changes or irritation  ENT: NEGATIVE for ear, mouth and throat problems  RESP: NEGATIVE for significant cough or SOB  BREAST: NEGATIVE for masses, tenderness or discharge  CV: NEGATIVE for chest pain, palpitations or peripheral edema  CV: Hx HTN  GI: NEGATIVE for nausea, abdominal pain, heartburn, or change in bowel habits  : NEGATIVE for unusual urinary or vaginal symptoms. No vaginal bleeding.  MUSCULOSKELETAL: NEGATIVE for significant arthralgias or myalgia  NEURO: NEGATIVE for weakness, dizziness or paresthesias  ENDOCRINE: NEGATIVE for temperature intolerance, skin/hair changes  HEME/ALLERGY/IMMUNE: NEGATIVE for bleeding problems  PSYCHIATRIC: NEGATIVE for changes in mood or affect      OBJECTIVE:   /76   Pulse 76   Temp 97.4  F (36.3  C) (Tympanic)   Resp 16   Ht 1.638 m (5' 4.5\")   Wt 79.8 kg (175 lb 14.4 oz)   SpO2 99%   BMI 29.73 kg/m    Physical Exam  GENERAL APPEARANCE: healthy, alert and no distress  EYES: Eyes grossly normal to inspection, PERRL and conjunctivae and sclerae normal  HENT: ear canals and TM's normal, nose and mouth without ulcers or lesions, oropharynx clear and oral mucous membranes moist  NECK: no adenopathy, no asymmetry, masses, or scars and thyroid normal to palpation  RESP: lungs clear to auscultation - no rales, rhonchi or wheezes  BREAST: normal without masses, tenderness or nipple discharge and no palpable axillary masses or adenopathy  CV: regular rate and rhythm, normal S1 S2, no S3 or S4, no murmur, click or rub, no peripheral edema and peripheral pulses " strong  ABDOMEN: soft, nontender, no hepatosplenomegaly, no masses and bowel sounds normal  MS: no musculoskeletal defects are noted and gait is age appropriate without ataxia  SKIN: no suspicious lesions or rashes  NEURO: Normal strength and tone, sensory exam grossly normal, mentation intact and speech normal  PSYCH: mentation appears normal and affect normal/bright    Diagnostic Test Results:  Labs reviewed in Epic    ASSESSMENT/PLAN:   (Z00.00) Routine general medical examination at a health care facility  (primary encounter diagnosis)  Comment:   Plan: Discussed on regular exercises, daily calcium intake, healthy eating, self breast exams monthly and routine dental checks      (Z11.59) Need for hepatitis C screening test  Comment:   Plan: Hepatitis C Screen Reflex to HCV RNA Quant and         Genotype            (Z23) Need for COVID-19 vaccine  Comment:   Plan: COVID-19,PF,PFIZER (12+ Yrs PURPLE LABEL)            (Z23) Need for shingles vaccine  Comment:   Plan: Recommended  patient to have it done at the pharmacy after checking with insurance for coverage.      (D12.6) Tubular adenoma of colon  Comment:   Plan: Reviewed colonoscopy from December 2020 showing tubular adenoma of colon, recheck in 5 years    (Z12.31) Encounter for screening mammogram for malignant neoplasm of breast  Comment:   Plan: Reviewed normal mammogram from May 2021, repeat in 1 year    (Z12.4) Cervical cancer screening  Comment:   Plan: Patient is not due for Pap this year    (I10) Benign essential hypertension  Comment:   Plan: lisinopril-hydrochlorothiazide (ZESTORETIC)         20-25 MG tablet, CBC with platelets, Basic         metabolic panel  (Ca, Cl, CO2, Creat, Gluc, K,         Na, BUN), Albumin Random Urine Quantitative         with Creat Ratio          BP Readings from Last 6 Encounters:   12/07/21 120/76   12/09/20 112/68   10/19/20 134/83   04/01/20 135/84   12/30/19 128/80   12/11/19 (!) 142/98     Blood pressure is at goal,  "continue with current medications, low-salt diet, regular exercises    (Z13.0) Screening for deficiency anemia  Comment:   Plan: CBC with platelets            (Z13.1) Screening for diabetes mellitus (DM)  Comment:   Plan: Basic metabolic panel  (Ca, Cl, CO2, Creat,         Gluc, K, Na, BUN)            (Z13.220) Screening for hyperlipidemia  Comment:   Plan: Lipid panel reflex to direct LDL Fasting              Patient has been advised of split billing requirements and indicates understanding: Yes  COUNSELING:  Reviewed preventive health counseling, as reflected in patient instructions  Special attention given to:        Regular exercise       Healthy diet/nutrition       Vision screening       Immunizations    Vaccinated for: Covid booster           Osteoporosis prevention/bone health       Colon cancer screening       Consider Hep C screening for all patients one time for ages 18-79 years       (Az[place])menopause management       The 10-year ASCVD risk score (Derek STRICKLAND Jr., et al., 2013) is: 2%    Values used to calculate the score:      Age: 56 years      Sex: Female      Is Non- : No      Diabetic: No      Tobacco smoker: No      Systolic Blood Pressure: 120 mmHg      Is BP treated: Yes      HDL Cholesterol: 68 mg/dL      Total Cholesterol: 203 mg/dL    Estimated body mass index is 29.73 kg/m  as calculated from the following:    Height as of this encounter: 1.638 m (5' 4.5\").    Weight as of this encounter: 79.8 kg (175 lb 14.4 oz).    Weight management plan: Discussed healthy diet and exercise guidelines    She reports that she has never smoked. She has never used smokeless tobacco.      Counseling Resources:  ATP IV Guidelines  Pooled Cohorts Equation Calculator  Breast Cancer Risk Calculator  BRCA-Related Cancer Risk Assessment: FHS-7 Tool  FRAX Risk Assessment  ICSI Preventive Guidelines  Dietary Guidelines for Americans, 2010  USDA's MyPlate  ASA Prophylaxis  Lung CA " Screening    Ary Saldivar MD  [facility]  Chart documentation done in part with Dragon Voice recognition Software. Although reviewed after completion, some word and grammatical error may remain.

## 2021-12-06 ASSESSMENT — ENCOUNTER SYMPTOMS
NERVOUS/ANXIOUS: 0
FREQUENCY: 0
EYE PAIN: 0
ABDOMINAL PAIN: 0
HEMATOCHEZIA: 0
HEARTBURN: 0
SORE THROAT: 0
COUGH: 0
DIZZINESS: 0
DYSURIA: 0
PARESTHESIAS: 0
WEAKNESS: 0
HEMATURIA: 0
SHORTNESS OF BREATH: 0
DIARRHEA: 0
PALPITATIONS: 0
MYALGIAS: 0
JOINT SWELLING: 0
HEADACHES: 0
CONSTIPATION: 0
BREAST MASS: 0
FEVER: 0
ARTHRALGIAS: 0
NAUSEA: 0
CHILLS: 0

## 2021-12-07 ENCOUNTER — OFFICE VISIT (OUTPATIENT)
Dept: FAMILY MEDICINE | Facility: CLINIC | Age: 56
End: 2021-12-07
Payer: COMMERCIAL

## 2021-12-07 VITALS
HEART RATE: 76 BPM | HEIGHT: 65 IN | DIASTOLIC BLOOD PRESSURE: 76 MMHG | OXYGEN SATURATION: 99 % | TEMPERATURE: 97.4 F | SYSTOLIC BLOOD PRESSURE: 120 MMHG | RESPIRATION RATE: 16 BRPM | WEIGHT: 175.9 LBS | BODY MASS INDEX: 29.31 KG/M2

## 2021-12-07 DIAGNOSIS — I10 BENIGN ESSENTIAL HYPERTENSION: ICD-10-CM

## 2021-12-07 DIAGNOSIS — D12.6 TUBULAR ADENOMA OF COLON: ICD-10-CM

## 2021-12-07 DIAGNOSIS — Z13.1 SCREENING FOR DIABETES MELLITUS (DM): ICD-10-CM

## 2021-12-07 DIAGNOSIS — Z00.00 ROUTINE GENERAL MEDICAL EXAMINATION AT A HEALTH CARE FACILITY: Primary | ICD-10-CM

## 2021-12-07 DIAGNOSIS — Z23 NEED FOR SHINGLES VACCINE: ICD-10-CM

## 2021-12-07 DIAGNOSIS — Z11.59 NEED FOR HEPATITIS C SCREENING TEST: ICD-10-CM

## 2021-12-07 DIAGNOSIS — Z13.0 SCREENING FOR DEFICIENCY ANEMIA: ICD-10-CM

## 2021-12-07 DIAGNOSIS — Z13.220 SCREENING FOR HYPERLIPIDEMIA: ICD-10-CM

## 2021-12-07 DIAGNOSIS — Z12.4 CERVICAL CANCER SCREENING: ICD-10-CM

## 2021-12-07 DIAGNOSIS — Z23 NEED FOR COVID-19 VACCINE: ICD-10-CM

## 2021-12-07 DIAGNOSIS — Z12.31 ENCOUNTER FOR SCREENING MAMMOGRAM FOR MALIGNANT NEOPLASM OF BREAST: ICD-10-CM

## 2021-12-07 LAB
ANION GAP SERPL CALCULATED.3IONS-SCNC: 7 MMOL/L (ref 3–14)
BUN SERPL-MCNC: 12 MG/DL (ref 7–30)
CALCIUM SERPL-MCNC: 8.8 MG/DL (ref 8.5–10.1)
CHLORIDE BLD-SCNC: 97 MMOL/L (ref 94–109)
CHOLEST SERPL-MCNC: 186 MG/DL
CO2 SERPL-SCNC: 29 MMOL/L (ref 20–32)
CREAT SERPL-MCNC: 0.72 MG/DL (ref 0.52–1.04)
CREAT UR-MCNC: 44 MG/DL
ERYTHROCYTE [DISTWIDTH] IN BLOOD BY AUTOMATED COUNT: 12.1 % (ref 10–15)
FASTING STATUS PATIENT QL REPORTED: YES
GFR SERPL CREATININE-BSD FRML MDRD: >90 ML/MIN/1.73M2
GLUCOSE BLD-MCNC: 95 MG/DL (ref 70–99)
HCT VFR BLD AUTO: 38.7 % (ref 35–47)
HCV AB SERPL QL IA: NONREACTIVE
HDLC SERPL-MCNC: 58 MG/DL
HGB BLD-MCNC: 13.1 G/DL (ref 11.7–15.7)
LDLC SERPL CALC-MCNC: 105 MG/DL
MCH RBC QN AUTO: 31.5 PG (ref 26.5–33)
MCHC RBC AUTO-ENTMCNC: 33.9 G/DL (ref 31.5–36.5)
MCV RBC AUTO: 93 FL (ref 78–100)
MICROALBUMIN UR-MCNC: <5 MG/L
MICROALBUMIN/CREAT UR: NORMAL MG/G{CREAT}
NONHDLC SERPL-MCNC: 128 MG/DL
PLATELET # BLD AUTO: 258 10E3/UL (ref 150–450)
POTASSIUM BLD-SCNC: 3.5 MMOL/L (ref 3.4–5.3)
RBC # BLD AUTO: 4.16 10E6/UL (ref 3.8–5.2)
SODIUM SERPL-SCNC: 133 MMOL/L (ref 133–144)
TRIGL SERPL-MCNC: 113 MG/DL
WBC # BLD AUTO: 5.4 10E3/UL (ref 4–11)

## 2021-12-07 PROCEDURE — 82043 UR ALBUMIN QUANTITATIVE: CPT | Performed by: FAMILY MEDICINE

## 2021-12-07 PROCEDURE — 99396 PREV VISIT EST AGE 40-64: CPT | Performed by: FAMILY MEDICINE

## 2021-12-07 PROCEDURE — 80048 BASIC METABOLIC PNL TOTAL CA: CPT | Performed by: FAMILY MEDICINE

## 2021-12-07 PROCEDURE — 80061 LIPID PANEL: CPT | Performed by: FAMILY MEDICINE

## 2021-12-07 PROCEDURE — 85027 COMPLETE CBC AUTOMATED: CPT | Performed by: FAMILY MEDICINE

## 2021-12-07 PROCEDURE — 36415 COLL VENOUS BLD VENIPUNCTURE: CPT | Performed by: FAMILY MEDICINE

## 2021-12-07 PROCEDURE — 91300 COVID-19,PF,PFIZER (12+ YRS): CPT | Performed by: FAMILY MEDICINE

## 2021-12-07 PROCEDURE — 86803 HEPATITIS C AB TEST: CPT | Performed by: FAMILY MEDICINE

## 2021-12-07 PROCEDURE — 0004A COVID-19,PF,PFIZER (12+ YRS): CPT | Performed by: FAMILY MEDICINE

## 2021-12-07 RX ORDER — LISINOPRIL AND HYDROCHLOROTHIAZIDE 20; 25 MG/1; MG/1
1 TABLET ORAL DAILY
Qty: 90 TABLET | Refills: 3 | Status: SHIPPED | OUTPATIENT
Start: 2021-12-07 | End: 2023-01-25

## 2021-12-07 ASSESSMENT — ENCOUNTER SYMPTOMS
FEVER: 0
HEMATURIA: 0
BREAST MASS: 0
ABDOMINAL PAIN: 0
PARESTHESIAS: 0
NAUSEA: 0
PALPITATIONS: 0
SORE THROAT: 0
COUGH: 0
NERVOUS/ANXIOUS: 0
EYE PAIN: 0
DIARRHEA: 0
SHORTNESS OF BREATH: 0
HEMATOCHEZIA: 0
JOINT SWELLING: 0
CHILLS: 0
MYALGIAS: 0
CONSTIPATION: 0
HEADACHES: 0
FREQUENCY: 0
DYSURIA: 0
DIZZINESS: 0
ARTHRALGIAS: 0
HEARTBURN: 0
WEAKNESS: 0

## 2021-12-07 ASSESSMENT — MIFFLIN-ST. JEOR: SCORE: 1385.82

## 2021-12-08 NOTE — RESULT ENCOUNTER NOTE
Dear Sheri,  your lab test showed normal urine exam with no protein leak, normal kidney functions, electrolytes, hemoglobin, and improved fasting cholesterol numbers.    Your hepatitis C screening test is negative .  these are good and reassuring.  Please continue your efforts at healthy eating and regular exercises.   Let me know if you have any questions. Take care.  Ary Saldivar MD

## 2022-08-21 ENCOUNTER — HEALTH MAINTENANCE LETTER (OUTPATIENT)
Age: 57
End: 2022-08-21

## 2022-11-03 ENCOUNTER — ANCILLARY PROCEDURE (OUTPATIENT)
Dept: MAMMOGRAPHY | Facility: CLINIC | Age: 57
End: 2022-11-03
Attending: FAMILY MEDICINE
Payer: COMMERCIAL

## 2022-11-03 DIAGNOSIS — Z12.31 VISIT FOR SCREENING MAMMOGRAM: ICD-10-CM

## 2022-11-03 PROCEDURE — 77067 SCR MAMMO BI INCL CAD: CPT | Mod: GC | Performed by: STUDENT IN AN ORGANIZED HEALTH CARE EDUCATION/TRAINING PROGRAM

## 2022-11-21 ENCOUNTER — HEALTH MAINTENANCE LETTER (OUTPATIENT)
Age: 57
End: 2022-11-21

## 2023-01-25 ENCOUNTER — OFFICE VISIT (OUTPATIENT)
Dept: FAMILY MEDICINE | Facility: CLINIC | Age: 58
End: 2023-01-25
Payer: COMMERCIAL

## 2023-01-25 VITALS
WEIGHT: 171.7 LBS | HEIGHT: 64 IN | RESPIRATION RATE: 20 BRPM | OXYGEN SATURATION: 100 % | SYSTOLIC BLOOD PRESSURE: 132 MMHG | TEMPERATURE: 97.5 F | HEART RATE: 67 BPM | DIASTOLIC BLOOD PRESSURE: 82 MMHG | BODY MASS INDEX: 29.31 KG/M2

## 2023-01-25 DIAGNOSIS — Z23 NEED FOR SHINGLES VACCINE: ICD-10-CM

## 2023-01-25 DIAGNOSIS — Z13.1 SCREENING FOR DIABETES MELLITUS (DM): ICD-10-CM

## 2023-01-25 DIAGNOSIS — Z12.4 CERVICAL CANCER SCREENING: ICD-10-CM

## 2023-01-25 DIAGNOSIS — Z00.00 ROUTINE GENERAL MEDICAL EXAMINATION AT A HEALTH CARE FACILITY: Primary | ICD-10-CM

## 2023-01-25 DIAGNOSIS — D12.6 TUBULAR ADENOMA OF COLON: ICD-10-CM

## 2023-01-25 DIAGNOSIS — Z12.31 ENCOUNTER FOR SCREENING MAMMOGRAM FOR MALIGNANT NEOPLASM OF BREAST: ICD-10-CM

## 2023-01-25 DIAGNOSIS — Z13.0 SCREENING FOR DEFICIENCY ANEMIA: ICD-10-CM

## 2023-01-25 DIAGNOSIS — Z13.220 SCREENING FOR HYPERLIPIDEMIA: ICD-10-CM

## 2023-01-25 DIAGNOSIS — I10 BENIGN ESSENTIAL HYPERTENSION: ICD-10-CM

## 2023-01-25 LAB
ALBUMIN SERPL-MCNC: 3.9 G/DL (ref 3.4–5)
ALP SERPL-CCNC: 59 U/L (ref 40–150)
ALT SERPL W P-5'-P-CCNC: 45 U/L (ref 0–50)
ANION GAP SERPL CALCULATED.3IONS-SCNC: 7 MMOL/L (ref 3–14)
AST SERPL W P-5'-P-CCNC: 25 U/L (ref 0–45)
BILIRUB SERPL-MCNC: 0.4 MG/DL (ref 0.2–1.3)
BUN SERPL-MCNC: 13 MG/DL (ref 7–30)
CALCIUM SERPL-MCNC: 9.2 MG/DL (ref 8.5–10.1)
CHLORIDE BLD-SCNC: 103 MMOL/L (ref 94–109)
CHOLEST SERPL-MCNC: 183 MG/DL
CO2 SERPL-SCNC: 27 MMOL/L (ref 20–32)
CREAT SERPL-MCNC: 0.72 MG/DL (ref 0.52–1.04)
CREAT UR-MCNC: 45 MG/DL
ERYTHROCYTE [DISTWIDTH] IN BLOOD BY AUTOMATED COUNT: 11.9 % (ref 10–15)
FASTING STATUS PATIENT QL REPORTED: YES
GFR SERPL CREATININE-BSD FRML MDRD: >90 ML/MIN/1.73M2
GLUCOSE BLD-MCNC: 105 MG/DL (ref 70–99)
HCT VFR BLD AUTO: 42.2 % (ref 35–47)
HDLC SERPL-MCNC: 79 MG/DL
HGB BLD-MCNC: 13.6 G/DL (ref 11.7–15.7)
LDLC SERPL CALC-MCNC: 89 MG/DL
MCH RBC QN AUTO: 31.1 PG (ref 26.5–33)
MCHC RBC AUTO-ENTMCNC: 32.2 G/DL (ref 31.5–36.5)
MCV RBC AUTO: 96 FL (ref 78–100)
MICROALBUMIN UR-MCNC: 6 MG/L
MICROALBUMIN/CREAT UR: 13.33 MG/G CR (ref 0–25)
NONHDLC SERPL-MCNC: 104 MG/DL
PLATELET # BLD AUTO: 237 10E3/UL (ref 150–450)
POTASSIUM BLD-SCNC: 3.7 MMOL/L (ref 3.4–5.3)
PROT SERPL-MCNC: 7.6 G/DL (ref 6.8–8.8)
RBC # BLD AUTO: 4.38 10E6/UL (ref 3.8–5.2)
SODIUM SERPL-SCNC: 137 MMOL/L (ref 133–144)
TRIGL SERPL-MCNC: 76 MG/DL
WBC # BLD AUTO: 4.5 10E3/UL (ref 4–11)

## 2023-01-25 PROCEDURE — 80053 COMPREHEN METABOLIC PANEL: CPT | Performed by: FAMILY MEDICINE

## 2023-01-25 PROCEDURE — 82043 UR ALBUMIN QUANTITATIVE: CPT | Performed by: FAMILY MEDICINE

## 2023-01-25 PROCEDURE — 99396 PREV VISIT EST AGE 40-64: CPT | Mod: 25 | Performed by: FAMILY MEDICINE

## 2023-01-25 PROCEDURE — 36415 COLL VENOUS BLD VENIPUNCTURE: CPT | Performed by: FAMILY MEDICINE

## 2023-01-25 PROCEDURE — 90746 HEPB VACCINE 3 DOSE ADULT IM: CPT | Performed by: FAMILY MEDICINE

## 2023-01-25 PROCEDURE — 90471 IMMUNIZATION ADMIN: CPT | Performed by: FAMILY MEDICINE

## 2023-01-25 PROCEDURE — 80061 LIPID PANEL: CPT | Performed by: FAMILY MEDICINE

## 2023-01-25 PROCEDURE — 82570 ASSAY OF URINE CREATININE: CPT | Performed by: FAMILY MEDICINE

## 2023-01-25 PROCEDURE — 85027 COMPLETE CBC AUTOMATED: CPT | Performed by: FAMILY MEDICINE

## 2023-01-25 PROCEDURE — 99214 OFFICE O/P EST MOD 30 MIN: CPT | Mod: 25 | Performed by: FAMILY MEDICINE

## 2023-01-25 RX ORDER — LISINOPRIL AND HYDROCHLOROTHIAZIDE 20; 25 MG/1; MG/1
1 TABLET ORAL DAILY
Qty: 90 TABLET | Refills: 3 | Status: SHIPPED | OUTPATIENT
Start: 2023-01-25 | End: 2023-09-27

## 2023-01-25 ASSESSMENT — ENCOUNTER SYMPTOMS
JOINT SWELLING: 0
ABDOMINAL PAIN: 0
HEADACHES: 0
DYSURIA: 0
HEMATURIA: 0
SORE THROAT: 0
MYALGIAS: 0
NAUSEA: 0
PALPITATIONS: 0
DIARRHEA: 0
FEVER: 0
BREAST MASS: 0
NERVOUS/ANXIOUS: 0
SHORTNESS OF BREATH: 0
ARTHRALGIAS: 0
CONSTIPATION: 0
COUGH: 0
WEAKNESS: 0
DIZZINESS: 0
HEMATOCHEZIA: 0
EYE PAIN: 0
PARESTHESIAS: 0
FREQUENCY: 0
CHILLS: 0
HEARTBURN: 0

## 2023-01-25 ASSESSMENT — PAIN SCALES - GENERAL: PAINLEVEL: NO PAIN (0)

## 2023-01-25 NOTE — RESULT ENCOUNTER NOTE
Bruno Wade,  Your recent labs showed normal hemoglobin, blood counts, excellent fasting cholesterol numbers, urine exam with no protein leak.  Your liver and kidney functions are good  Your fasting blood sugar is slightly elevated but not concerning for diabetes  We will continue to monitor this.   Let me know if you have any questions. Take care.  Ary Saldivar MD

## 2023-01-25 NOTE — PROGRESS NOTES
SUBJECTIVE:   CC: Sheri is an 57 year old who presents for preventive health visit.     Healthy Habits:     Getting at least 3 servings of Calcium per day:  Yes    Bi-annual eye exam:  Yes    Dental care twice a year:  Yes    Sleep apnea or symptoms of sleep apnea:  None    Diet:  Regular (no restrictions)    Frequency of exercise:  4-5 days/week    Duration of exercise:  45-60 minutes    Taking medications regularly:  Yes    Medication side effects:  None    PHQ-2 Total Score: 0    Additional concerns today:  No          Hypertension Follow-up      Do you check your blood pressure regularly outside of the clinic? No , other than at office visits/dental visits    Are you following a low salt diet? Yes    Are your blood pressures ever more than 140 on the top number (systolic) OR more   than 90 on the bottom number (diastolic), for example 140/90? no      Today's PHQ-2 Score:   PHQ-2 ( 1999 [employer]) 1/25/2023   Q1: Little interest or pleasure in doing things 0   Q2: Feeling down, depressed or hopeless 0   PHQ-2 Score 0   PHQ-2 Total Score (12-17 Years)- Positive if 3 or more points; Administer PHQ-A if positive -   Q1: Little interest or pleasure in doing things Not at all   Q2: Feeling down, depressed or hopeless Not at all   PHQ-2 Score 0           Social History     Tobacco Use     Smoking status: Never     Smokeless tobacco: Never   Substance Use Topics     Alcohol use: Yes     Alcohol/week: 5.8 standard drinks     Types: 7 Standard drinks or equivalent per week     Comment: 1-2 drinks per day      If you drink alcohol do you typically have >3 drinks per day or >7 drinks per week? Yes- 1-2 drinks/per day      Alcohol Use 1/25/2023   Prescreen: >3 drinks/day or >7 drinks/week? No   Prescreen: >3 drinks/day or >7 drinks/week? -       Reviewed orders with patient.  Reviewed health maintenance and updated orders accordingly - Yes  Lab work is in process  Labs reviewed in EPIC  BP Readings from Last 3 Encounters:    01/25/23 132/82   12/07/21 120/76   12/09/20 112/68    Wt Readings from Last 3 Encounters:   01/25/23 77.9 kg (171 lb 11.2 oz)   12/07/21 79.8 kg (175 lb 14.4 oz)   12/02/20 76.7 kg (169 lb)                  Patient Active Problem List   Diagnosis     CARDIOVASCULAR SCREENING; LDL GOAL LESS THAN 160     Family history of breast cancer     Overweight (BMI 25.0-29.9)     ASCUS with positive high risk HPV cervical     Benign essential hypertension     Tubular adenoma of colon     Elevated ALT measurement     Past Surgical History:   Procedure Laterality Date     COLONOSCOPY N/A 9/18/2017    Procedure: COMBINED COLONOSCOPY, SINGLE OR MULTIPLE BIOPSY/POLYPECTOMY BY BIOPSY;;  Surgeon: Milan Cameron MD;  Location: [facility]     COLONOSCOPY WITH CO2 INSUFFLATION N/A 9/18/2017    Procedure: COLONOSCOPY WITH CO2 INSUFFLATION;  Screening for colon cancer  Routine general medical examination at a health care facility  bmi 28.19  Pharmacy: [facility] [place]  ;  Surgeon: Milan Cameron MD;  Location: [facility]     COLONOSCOPY WITH CO2 INSUFFLATION N/A 12/9/2020    Procedure: COLONOSCOPY, WITH CO2 INSUFFLATION;  Surgeon: Milan Cameron MD;  Location: [facility]     CYSTECTOMY PILONIDAL  1996     HC TOOTH EXTRACTION W/FORCEP  2002?     SURGICAL HISTORY OF -       infertility workup-hysteroscope        Social History     Tobacco Use     Smoking status: Never     Smokeless tobacco: Never   Substance Use Topics     Alcohol use: Yes     Alcohol/week: 5.8 standard drinks     Types: 7 Standard drinks or equivalent per week     Comment: 1-2 drinks per day      Family History   Problem Relation Age of Onset     Hypertension Father      Lipids Father      Diabetes Maternal Grandfather      Cerebrovascular Disease Maternal Grandmother      Breast Cancer Mother      Cancer Mother 73        tumors      C.A.D. Paternal Uncle      Asthma No family hx of      Cancer - colorectal No family hx of      Prostate Cancer No family hx of          Current  Outpatient Medications   Medication Sig Dispense Refill     Blood Pressure Monitor KIT Use to check BP daily 1 kit 0     lisinopril-hydrochlorothiazide (ZESTORETIC) 20-25 MG tablet Take 1 tablet by mouth daily 90 tablet 3     Multiple Vitamins-Minerals (DAILY MULTIVITAMIN PO) Take 1 tablet by mouth daily       No Known Allergies  Recent Labs   Lab Test 12/07/21  0909 10/19/20  0951 12/11/19  1044 11/21/18  1319 06/28/17  1023   * 114* 80 81 70   HDL 58 68 61 76 76   TRIG 113 104 75 149 74   ALT  --  21 52* 20 27   CR 0.72 0.70 0.82 0.74 0.79   GFRESTIMATED >90 >90 82 83 77   GFRESTBLACK  --  >90 >90 >90 >90  African American GFR Calc     POTASSIUM 3.5 4.1 3.5 3.9 3.7   TSH  --   --  1.21  --  1.80        Breast Cancer Screening:    FHS-7:   Breast CA Risk Assessment (FHS-7) 12/6/2021 11/3/2022 1/25/2023   Did any of your first-degree relatives have breast or ovarian cancer? Yes Yes Yes   Did any of your relatives have bilateral breast cancer? Unknown No Unknown   Did any man in your family have breast cancer? No No No   Did any woman in your family have breast and ovarian cancer? No No Yes   Did any woman in your family have breast cancer before age 50 y? No No No   Do you have 2 or more relatives with breast and/or ovarian cancer? Yes No Unknown   Do you have 2 or more relatives with breast and/or bowel cancer? No No No     click delete button to remove this line now  Mammogram Screening: Recommended mammography every 1-2 years with patient discussion and risk factor consideration  Pertinent mammograms are reviewed under the imaging tab.    History of abnormal Pap smear: NO - age 30-65 PAP every 5 years with negative HPV co-testing recommended  PAP / HPV Latest Ref Rng & Units 10/19/2020 6/28/2017 6/10/2016   PAP (Historical) - NIL NIL OTHER-NIL, See Result   HPV16 NEG:Negative Negative Negative Negative   HPV18 NEG:Negative Negative Negative Negative   HRHPV NEG:Negative Negative Negative Negative      Reviewed and updated as needed this visit by clinical staff   Tobacco  Allergies  Meds              Reviewed and updated as needed this visit by Provider   Tobacco                 Past Medical History:   Diagnosis Date     ASCUS with positive high risk HPV //14    + HR HPV 52     Breast cyst      Chicken pox      H/O colposcopy with cervical biopsy 2014    Neg for dysplasia     Infertility     s/p 3 rounds of IVF. Was pregnant only very briefly     Obesity      Positive TB test     Has been on INH [id number]      Past Surgical History:   Procedure Laterality Date     COLONOSCOPY N/A 2017    Procedure: COMBINED COLONOSCOPY, SINGLE OR MULTIPLE BIOPSY/POLYPECTOMY BY BIOPSY;;  Surgeon: Milan Cameron MD;  Location: [facility]     COLONOSCOPY WITH CO2 INSUFFLATION N/A 2017    Procedure: COLONOSCOPY WITH CO2 INSUFFLATION;  Screening for colon cancer  Routine general medical examination at a health care facility  bmi 28.19  Pharmacy: [facility] - [place]  ;  Surgeon: Milan Cameron MD;  Location: [facility]     COLONOSCOPY WITH CO2 INSUFFLATION N/A 2020    Procedure: COLONOSCOPY, WITH CO2 INSUFFLATION;  Surgeon: Milan Cameron MD;  Location: [facility]     CYSTECTOMY PILONIDAL       HC TOOTH EXTRACTION W/FORCEP  ?     SURGICAL HISTORY OF -       infertility workup-hysteroscope      OB History    Para Term  AB Living   0 0 0 0 0 0   SAB IAB Ectopic Multiple Live Births   0 0 0 0 0       Review of Systems   Constitutional: Negative for chills and fever.   HENT: Negative for congestion, ear pain, hearing loss and sore throat.    Eyes: Negative for pain and visual disturbance.   Respiratory: Negative for cough and shortness of breath.    Cardiovascular: Negative for chest pain, palpitations and peripheral edema.   Gastrointestinal: Negative for abdominal pain, constipation, diarrhea, heartburn, hematochezia and nausea.   Breasts:  Negative for tenderness, breast mass and discharge.  "  Genitourinary: Negative for dysuria, frequency, genital sores, hematuria, pelvic pain, urgency, vaginal bleeding and vaginal discharge.   Musculoskeletal: Negative for arthralgias, joint swelling and myalgias.   Skin: Negative for rash.   Neurological: Negative for dizziness, weakness, headaches and paresthesias.   Psychiatric/Behavioral: Negative for mood changes. The patient is not nervous/anxious.      CONSTITUTIONAL: NEGATIVE for fever, chills, change in weight  INTEGUMENTARY/SKIN: NEGATIVE for worrisome rashes, moles or lesions  EYES: NEGATIVE for vision changes or irritation  ENT: NEGATIVE for ear, mouth and throat problems  RESP: NEGATIVE for significant cough or SOB  BREAST: NEGATIVE for masses, tenderness or discharge  CV: NEGATIVE for chest pain, palpitations or peripheral edema  CV: Hx HTN  GI: NEGATIVE for nausea, abdominal pain, heartburn, or change in bowel habits  : NEGATIVE for unusual urinary or vaginal symptoms. No vaginal bleeding.  MUSCULOSKELETAL: NEGATIVE for significant arthralgias or myalgia  NEURO: NEGATIVE for weakness, dizziness or paresthesias  ENDOCRINE: NEGATIVE for temperature intolerance, skin/hair changes  HEME/ALLERGY/IMMUNE: NEGATIVE for bleeding problems  PSYCHIATRIC: NEGATIVE for changes in mood or affect      OBJECTIVE:   /82   Pulse 67   Temp 97.5  F (36.4  C) (Oral)   Resp 20   Ht 1.63 m (5' 4.17\")   Wt 77.9 kg (171 lb 11.2 oz)   SpO2 100%   BMI 29.31 kg/m    Physical Exam  GENERAL APPEARANCE: healthy, alert and no distress  EYES: Eyes grossly normal to inspection, PERRL and conjunctivae and sclerae normal  HENT: ear canals and TM's normal, nose and mouth without ulcers or lesions, oropharynx clear and oral mucous membranes moist  NECK: no adenopathy, no asymmetry, masses, or scars and thyroid normal to palpation  RESP: lungs clear to auscultation - no rales, rhonchi or wheezes  BREAST: normal without masses, tenderness or nipple discharge and no palpable " axillary masses or adenopathy  CV: regular rate and rhythm, normal S1 S2, no S3 or S4, no murmur, click or rub, no peripheral edema and peripheral pulses strong  ABDOMEN: soft, nontender, no hepatosplenomegaly, no masses and bowel sounds normal  MS: no musculoskeletal defects are noted and gait is age appropriate without ataxia  SKIN: no suspicious lesions or rashes  NEURO: Normal strength and tone, sensory exam grossly normal, mentation intact and speech normal  PSYCH: mentation appears normal and affect normal/bright    Diagnostic Test Results:  Labs reviewed in Epic    ASSESSMENT/PLAN:   (Z00.00) Routine general medical examination at a health care facility  (primary encounter diagnosis)  Comment:   Plan: Discussed on regular exercises, daily calcium intake, healthy eating, self breast exams monthly and routine dental checks    (I10) Benign essential hypertension  Comment:   Plan: lisinopril-hydrochlorothiazide (ZESTORETIC)         20-25 MG tablet, CBC with platelets,         Comprehensive metabolic panel (BMP + Alb, Alk         Phos, ALT, AST, Total. Bili, TP), Albumin         Random Urine Quantitative with Creat Ratio,         Blood Pressure Monitor KIT          BP Readings from Last 6 Encounters:   01/25/23 132/82   12/07/21 120/76   12/09/20 112/68   10/19/20 134/83   04/01/20 135/84   12/30/19 128/80     Initial blood pressures were high-136/95, 131/90.  Rechecked-132/82  Recommended to continue with current dose of Zestoretic, refills given today  Will get fasting labs for therapeutic drug monitoring  Will follow low salt diet, weight loss and regular exercises.  Emphasized on limiting alcohol use to not more than 1 drink per day/total of not more than 7 drinks per week  Patient verbalised understanding and is agreeable to the plan.  Recheck in 1 year or sooner if needed    (D12.6) Tubular adenoma of colon  Comment:   Plan: Reviewed colonoscopy from 2020 showing tubular adenoma of colon, recheck in 5 years  in 2025    (Z12.4) Cervical cancer screening  Comment:   Plan: Patient is not due for Pap until this October, will get it done at the next year physical    (Z12.31) Encounter for screening mammogram for malignant neoplasm of breast  Comment:   Plan: Reviewed normal mammogram from 11/2022    (Z13.0) Screening for deficiency anemia  Comment:   Plan: CBC with platelets            (Z13.1) Screening for diabetes mellitus (DM)  Comment:   Plan: Comprehensive metabolic panel (BMP + Alb, Alk         Phos, ALT, AST, Total. Bili, TP)            (Z13.220) Screening for hyperlipidemia  Comment:   Plan: Lipid panel reflex to direct LDL Fasting            (Z23) Need for shingles vaccine  Comment:   Plan: Recommended  patient to have it done at the pharmacy after checking with insurance for coverage.            COUNSELING:  Reviewed preventive health counseling, as reflected in patient instructions  Special attention given to:        Regular exercise       Healthy diet/nutrition       Vision screening       Immunizations    Vaccinated for: Hepatitis B and Declined: Influenza due to Concerns about side effects/safety               Alcohol Use       Osteoporosis prevention/bone health       Colorectal Cancer Screening       (Aziza)menopause management       The 10-year ASCVD risk score (Narcisa DK, et al., 2019) is: 2.8%    Values used to calculate the score:      Age: 57 years      Sex: Female      Is Non- : No      Diabetic: No      Tobacco smoker: No      Systolic Blood Pressure: 132 mmHg      Is BP treated: Yes      HDL Cholesterol: 58 mg/dL      Total Cholesterol: 186 mg/dL        She reports that she has never smoked. She has never used smokeless tobacco.      Chart documentation done in part with Dragon Voice recognition Software. Although reviewed after completion, some word and grammatical error may remain.  Ary Saldivar MD  [facility]

## 2023-07-23 ENCOUNTER — OFFICE VISIT (OUTPATIENT)
Dept: URGENT CARE | Facility: URGENT CARE | Age: 58
End: 2023-07-23
Payer: COMMERCIAL

## 2023-07-23 VITALS
RESPIRATION RATE: 20 BRPM | WEIGHT: 165.9 LBS | OXYGEN SATURATION: 98 % | HEART RATE: 114 BPM | SYSTOLIC BLOOD PRESSURE: 152 MMHG | TEMPERATURE: 102 F | BODY MASS INDEX: 28.32 KG/M2 | DIASTOLIC BLOOD PRESSURE: 84 MMHG

## 2023-07-23 DIAGNOSIS — R50.9 FEVER, UNSPECIFIED FEVER CAUSE: Primary | ICD-10-CM

## 2023-07-23 DIAGNOSIS — L03.115 CELLULITIS OF RIGHT LOWER EXTREMITY: ICD-10-CM

## 2023-07-23 LAB
BASOPHILS # BLD AUTO: 0 10E3/UL (ref 0–0.2)
BASOPHILS NFR BLD AUTO: 0 %
EOSINOPHIL # BLD AUTO: 0 10E3/UL (ref 0–0.7)
EOSINOPHIL NFR BLD AUTO: 0 %
ERYTHROCYTE [DISTWIDTH] IN BLOOD BY AUTOMATED COUNT: 12.5 % (ref 10–15)
HCT VFR BLD AUTO: 39.1 % (ref 35–47)
HGB BLD-MCNC: 13.9 G/DL (ref 11.7–15.7)
IMM GRANULOCYTES # BLD: 0 10E3/UL
IMM GRANULOCYTES NFR BLD: 0 %
LYMPHOCYTES # BLD AUTO: 0.5 10E3/UL (ref 0.8–5.3)
LYMPHOCYTES NFR BLD AUTO: 7 %
MCH RBC QN AUTO: 31.2 PG (ref 26.5–33)
MCHC RBC AUTO-ENTMCNC: 35.5 G/DL (ref 31.5–36.5)
MCV RBC AUTO: 88 FL (ref 78–100)
MONOCYTES # BLD AUTO: 0.4 10E3/UL (ref 0–1.3)
MONOCYTES NFR BLD AUTO: 6 %
NEUTROPHILS # BLD AUTO: 5.7 10E3/UL (ref 1.6–8.3)
NEUTROPHILS NFR BLD AUTO: 87 %
PLATELET # BLD AUTO: 182 10E3/UL (ref 150–450)
RBC # BLD AUTO: 4.46 10E6/UL (ref 3.8–5.2)
WBC # BLD AUTO: 6.6 10E3/UL (ref 4–11)

## 2023-07-23 PROCEDURE — 85025 COMPLETE CBC W/AUTO DIFF WBC: CPT | Performed by: PHYSICIAN ASSISTANT

## 2023-07-23 PROCEDURE — 87040 BLOOD CULTURE FOR BACTERIA: CPT | Performed by: PHYSICIAN ASSISTANT

## 2023-07-23 PROCEDURE — 36415 COLL VENOUS BLD VENIPUNCTURE: CPT | Performed by: PHYSICIAN ASSISTANT

## 2023-07-23 PROCEDURE — 99213 OFFICE O/P EST LOW 20 MIN: CPT | Performed by: PHYSICIAN ASSISTANT

## 2023-07-23 ASSESSMENT — ENCOUNTER SYMPTOMS
FEVER: 1
COLOR CHANGE: 1

## 2023-07-23 NOTE — PROGRESS NOTES
SUBJECTIVE:   Sheri Montoya is a 58 year old female presenting with a chief complaint of   Chief Complaint   Patient presents with     Rash     Behind right knee; no pain achy tues/wed       She is an established patient of [facility].  Patient presents with feelings of malaise, fever and an rash behind right knee that is slightly painful.  She states she noted the rash behind her knee after a run 2 days ago, but has progressed in size and started with fevers.          Review of Systems   Constitutional: Positive for fever.   Skin: Positive for color change and rash.   All other systems reviewed and are negative.      Past Medical History:   Diagnosis Date     ASCUS with positive high risk HPV 7/23/14    + HR HPV 52     Breast cyst      Chicken pox      H/O colposcopy with cervical biopsy 9/2014    Neg for dysplasia     Infertility     s/p 3 rounds of IVF. Was pregnant only very briefly     Obesity      Positive TB test     [facility]     Family History   Problem Relation Age of Onset     Hypertension Father      Lipids Father      Diabetes Maternal Grandfather      Cerebrovascular Disease Maternal Grandmother      Breast Cancer Mother      Cancer Mother 73        tumors      C.A.D. Paternal Uncle      Asthma No family hx of      Cancer - colorectal No family hx of      Prostate Cancer No family hx of      Current Outpatient Medications   Medication Sig Dispense Refill     Blood Pressure Monitor KIT Use to check BP daily 1 kit 0     lisinopril-hydrochlorothiazide (ZESTORETIC) 20-25 MG tablet Take 1 tablet by mouth daily 90 tablet 3     Multiple Vitamins-Minerals (DAILY MULTIVITAMIN PO) Take 1 tablet by mouth daily       Social History     Tobacco Use     Smoking status: Never     Smokeless tobacco: Never   Substance Use Topics     Alcohol use: Yes     Alcohol/week: 5.8 standard drinks of alcohol     Types: 7 Standard drinks or equivalent per week     Comment: 1-2 drinks per day         OBJECTIVE  BP (!) 152/84   Pulse 114   Temp (!) 102  F (38.9  C) (Tympanic)   Resp 20   Wt 75.3 kg (165 lb 14.4 oz)   SpO2 98%   BMI 28.32 kg/m      Physical Exam  Vitals and nursing note reviewed.   Constitutional:       General: She is not in acute distress.     Appearance: Normal appearance. She is normal weight. She is not ill-appearing.   Eyes:      Extraocular Movements: Extraocular movements intact.      Conjunctiva/sclera: Conjunctivae normal.   Cardiovascular:      Rate and Rhythm: Tachycardia present.   Musculoskeletal:         General: Tenderness present. No swelling or signs of injury. Normal range of motion.   Skin:     General: Skin is warm and dry.      Findings: Erythema present.      Comments: Right posterior knee with erythema, warmth 8 cm x 17 cm.  Minor tenderness to palpation.  Negative aleksander.   Neurological:      Mental Status: She is alert.         Labs:  Results for orders placed or performed in visit on 07/23/23 (from the past 24 hour(s))   CBC with platelets and differential    Narrative    The following orders were created for panel order CBC with platelets and differential.  Procedure                               Abnormality         Status                     ---------                               -----------         ------                     CBC with platelets and d...[[id number]]                      In process                   Please view results for these tests on the individual orders.       X-Ray was not done.    ASSESSMENT:      ICD-10-CM    1. Fever, unspecified fever cause  R50.9 CBC with platelets and differential     Blood Culture Peripheral Blood     CBC with platelets and differential     Blood Culture Arm, Left      2. Cellulitis of right lower extremity  L03.115            Medical Decision Making:    Differential Diagnosis:  Rash: Cellulitis    Serious Comorbid Conditions:  Adult:  reviewed    PLAN:    Blood cultures pending.  Tylenol/motrin prn.  Discussed  reasons to seek immediate medical attention.  Additionally if no improvement or worsening in one week, may follow up with PCP and/or UC.    Rx for augmentin.    Followup:    If not improving or if condition worsens, follow up with your Primary Care Provider, If not improving or if conditions worsens over the next 12-24 hours, go to the Emergency Department    There are no Patient Instructions on file for this visit.

## 2023-07-28 LAB — BACTERIA BLD CULT: NO GROWTH

## 2023-08-23 ENCOUNTER — TELEPHONE (OUTPATIENT)
Dept: FAMILY MEDICINE | Facility: CLINIC | Age: 58
End: 2023-08-23
Payer: COMMERCIAL

## 2023-08-23 NOTE — TELEPHONE ENCOUNTER
Patient Quality Outreach    Patient is due for the following:   Hypertension -  BP check    Next Steps:   Patient has upcoming appointment, these items will be addressed at that time.    Type of outreach:    Did not contact      Questions for provider review:    None           Diane L. Schoenherr, RN

## 2023-09-27 ENCOUNTER — OFFICE VISIT (OUTPATIENT)
Dept: FAMILY MEDICINE | Facility: CLINIC | Age: 58
End: 2023-09-27
Payer: COMMERCIAL

## 2023-09-27 VITALS
HEIGHT: 65 IN | TEMPERATURE: 97.4 F | DIASTOLIC BLOOD PRESSURE: 87 MMHG | WEIGHT: 165.2 LBS | HEART RATE: 66 BPM | RESPIRATION RATE: 13 BRPM | BODY MASS INDEX: 27.52 KG/M2 | SYSTOLIC BLOOD PRESSURE: 130 MMHG | OXYGEN SATURATION: 100 %

## 2023-09-27 DIAGNOSIS — I10 BENIGN ESSENTIAL HYPERTENSION: ICD-10-CM

## 2023-09-27 DIAGNOSIS — R73.01 ELEVATED FASTING GLUCOSE: ICD-10-CM

## 2023-09-27 DIAGNOSIS — Z12.31 ENCOUNTER FOR SCREENING MAMMOGRAM FOR MALIGNANT NEOPLASM OF BREAST: ICD-10-CM

## 2023-09-27 DIAGNOSIS — Z13.1 SCREENING FOR DIABETES MELLITUS (DM): ICD-10-CM

## 2023-09-27 DIAGNOSIS — D12.6 TUBULAR ADENOMA OF COLON: ICD-10-CM

## 2023-09-27 DIAGNOSIS — Z00.00 ROUTINE GENERAL MEDICAL EXAMINATION AT A HEALTH CARE FACILITY: Primary | ICD-10-CM

## 2023-09-27 DIAGNOSIS — Z12.4 CERVICAL CANCER SCREENING: ICD-10-CM

## 2023-09-27 LAB
FASTING STATUS PATIENT QL REPORTED: YES
GLUCOSE SERPL-MCNC: 102 MG/DL (ref 70–99)
HBA1C MFR BLD: 5.1 % (ref 0–5.6)

## 2023-09-27 PROCEDURE — 36415 COLL VENOUS BLD VENIPUNCTURE: CPT | Performed by: FAMILY MEDICINE

## 2023-09-27 PROCEDURE — 83036 HEMOGLOBIN GLYCOSYLATED A1C: CPT | Performed by: FAMILY MEDICINE

## 2023-09-27 PROCEDURE — 99396 PREV VISIT EST AGE 40-64: CPT | Mod: 25 | Performed by: FAMILY MEDICINE

## 2023-09-27 PROCEDURE — 87624 HPV HI-RISK TYP POOLED RSLT: CPT | Performed by: FAMILY MEDICINE

## 2023-09-27 PROCEDURE — 82947 ASSAY GLUCOSE BLOOD QUANT: CPT | Performed by: FAMILY MEDICINE

## 2023-09-27 PROCEDURE — 90682 RIV4 VACC RECOMBINANT DNA IM: CPT | Performed by: FAMILY MEDICINE

## 2023-09-27 PROCEDURE — 90471 IMMUNIZATION ADMIN: CPT | Performed by: FAMILY MEDICINE

## 2023-09-27 PROCEDURE — 99213 OFFICE O/P EST LOW 20 MIN: CPT | Mod: 25 | Performed by: FAMILY MEDICINE

## 2023-09-27 PROCEDURE — G0145 SCR C/V CYTO,THINLAYER,RESCR: HCPCS | Performed by: FAMILY MEDICINE

## 2023-09-27 RX ORDER — LISINOPRIL AND HYDROCHLOROTHIAZIDE 20; 25 MG/1; MG/1
1 TABLET ORAL DAILY
Qty: 90 TABLET | Refills: 3 | Status: SHIPPED | OUTPATIENT
Start: 2023-09-27

## 2023-09-27 ASSESSMENT — ENCOUNTER SYMPTOMS
FEVER: 0
PARESTHESIAS: 0
FREQUENCY: 0
WEAKNESS: 0
EYE PAIN: 0
NERVOUS/ANXIOUS: 0
HEMATURIA: 0
SHORTNESS OF BREATH: 0
NAUSEA: 0
SORE THROAT: 0
ARTHRALGIAS: 0
DYSURIA: 0
DIZZINESS: 0
CONSTIPATION: 0
CHILLS: 0
BREAST MASS: 0
HEMATOCHEZIA: 0
MYALGIAS: 0
JOINT SWELLING: 0
COUGH: 0
DIARRHEA: 0
ABDOMINAL PAIN: 0
HEADACHES: 0
HEARTBURN: 0
PALPITATIONS: 0

## 2023-09-27 ASSESSMENT — PAIN SCALES - GENERAL: PAINLEVEL: NO PAIN (0)

## 2023-09-27 NOTE — PROGRESS NOTES
Prior to immunization administration, verified patients identity using patient s name and date of birth. Please see Immunization Activity for additional information.     Screening Questionnaire for Adult Immunization    Are you sick today?   No   Do you have allergies to medications, food, a vaccine component or latex?   No   Have you ever had a serious reaction after receiving a vaccination?   No   Do you have a long-term health problem with heart, lung, kidney, or metabolic disease (e.g., diabetes), asthma, a blood disorder, no spleen, complement component deficiency, a cochlear implant, or a spinal fluid leak?  Are you on long-term aspirin therapy?   No   Do you have cancer, leukemia, HIV/AIDS, or any other immune system problem?   No   Do you have a parent, brother, or sister with an immune system problem?   No   In the past 3 months, have you taken medications that affect  your immune system, such as prednisone, other steroids, or anticancer drugs; drugs for the treatment of rheumatoid arthritis, Crohn s disease, or psoriasis; or have you had radiation treatments?   No   Have you had a seizure, or a brain or other nervous system problem?   No   During the past year, have you received a transfusion of blood or blood    products, or been given immune (gamma) globulin or antiviral drug?   No   For women: Are you pregnant or is there a chance you could become       pregnant during the next month?   No   Have you received any vaccinations in the past 4 weeks?   No     Immunization questionnaire answers were all negative.      Patient instructed to remain in clinic for 15 minutes afterwards, and to report any adverse reactions.     Screening performed by Susan Carrion MA on 9/27/2023 at 8:21 AM.

## 2023-09-27 NOTE — RESULT ENCOUNTER NOTE
Bruno Wade,  Your recent labs show slight elevated fasting blood sugar but normal A1c with no concern for diabetes or prediabetes.  This is good and reassuring  Please continue your efforts on healthy eating, regular exercises and weight loss.   Let me know if you have any questions. Take care.  Ary Saldivar MD

## 2023-09-29 LAB
BKR LAB AP GYN ADEQUACY: NORMAL
BKR LAB AP GYN INTERPRETATION: NORMAL
BKR LAB AP HPV REFLEX: NORMAL
BKR LAB AP PREVIOUS ABNORMAL: NORMAL
PATH REPORT.COMMENTS IMP SPEC: NORMAL
PATH REPORT.COMMENTS IMP SPEC: NORMAL
PATH REPORT.RELEVANT HX SPEC: NORMAL

## 2023-10-03 LAB
HUMAN PAPILLOMA VIRUS 16 DNA: NEGATIVE
HUMAN PAPILLOMA VIRUS 18 DNA: NEGATIVE
HUMAN PAPILLOMA VIRUS FINAL DIAGNOSIS: NORMAL
HUMAN PAPILLOMA VIRUS OTHER HR: NEGATIVE

## 2023-11-06 ENCOUNTER — ANCILLARY PROCEDURE (OUTPATIENT)
Dept: MAMMOGRAPHY | Facility: CLINIC | Age: 58
End: 2023-11-06
Attending: FAMILY MEDICINE
Payer: COMMERCIAL

## 2023-11-06 DIAGNOSIS — Z12.31 VISIT FOR SCREENING MAMMOGRAM: ICD-10-CM

## 2023-11-06 PROCEDURE — 77067 SCR MAMMO BI INCL CAD: CPT | Mod: GC | Performed by: RADIOLOGY

## 2024-01-08 ENCOUNTER — PATIENT OUTREACH (OUTPATIENT)
Dept: GASTROENTEROLOGY | Facility: CLINIC | Age: 59
End: 2024-01-08
Payer: COMMERCIAL

## 2024-05-13 NOTE — LETTER
2/20/2019         RE: Sheri Montoya  [address] [address] [facility] [address]        Dear Colleague,    Thank you for referring your patient, Sheri Montoya, to the [facility]. Please see a copy of my visit note below.    [facility] Post-operative Visit note:  Subjective: The patient follows up for a wound check after undergoing Mohs surgery to remove basal cell carcinoma from the left lateral forehaed on 2/6/19. The resulting defect was repaired with a complex linear closure. The patient says the site is doing well and denies any bleeding, purulence, or excess pain/tenderness. She has been doing wound care as instructed. She has not experienced any problems with healing. The patient is otherwise feeling well. There are no other skin concerns at this time.  Objective: On exam, there is an appropriately healing surgical site on the left lateral forehead with no purulence, tenderness or surrounding erythema. Ends of the scar are well healed. Erosion and hemorrhagic crust centrally.   Assessment and Plan: Appropriately healing surgical site without any signs of infection    Instructed to continue daily dressing changes and application of petroleum jelly until healed over with new pink skin and all sutures are dissolved.     Return in 3 months for scar evaluation, at which time we can consider treatments such as dermabrasion to minimize scar's appearance if desired.    Recommended sunscreens SPF #50 or greater and protective clothing. Risk of scar dyspigmentation discussed.     Continue periodic self skin exams and report of any new or changing lesions.     Please refer to previous clinic note for details regarding treatment.  Follow up in 3 months for scar evaluation.     Staff:    Scribe Disclosure  I, Renato Gillette am serving as a scribe to document services personally performed by Dr. Carlos Jaime DO, based on data collection and the provider's  statements to me.     Provider Disclosure:   The documentation recorded by the scribe accurately reflects the services I personally performed and the decisions made by me.    Carlos Jaime DO    Department of Dermatology  [facility]: Phone: [phone], Fax:[phone]  [facility]: Phone: [phone], Fax: [phone]    Again, thank you for allowing me to participate in the care of your patient.        Sincerely,        Carlos Jaime MD     Alert and oriented to person, place and time. Normal mood and affect, no apparent risk to self or others

## 2024-08-28 ENCOUNTER — PATIENT OUTREACH (OUTPATIENT)
Dept: CARE COORDINATION | Facility: CLINIC | Age: 59
End: 2024-08-28
Payer: COMMERCIAL

## 2024-10-07 ENCOUNTER — PATIENT OUTREACH (OUTPATIENT)
Dept: CARE COORDINATION | Facility: CLINIC | Age: 59
End: 2024-10-07
Payer: COMMERCIAL

## 2024-10-23 ENCOUNTER — OFFICE VISIT (OUTPATIENT)
Dept: FAMILY MEDICINE | Facility: CLINIC | Age: 59
End: 2024-10-23
Payer: COMMERCIAL

## 2024-10-23 VITALS
WEIGHT: 177.13 LBS | BODY MASS INDEX: 30.24 KG/M2 | RESPIRATION RATE: 16 BRPM | SYSTOLIC BLOOD PRESSURE: 127 MMHG | DIASTOLIC BLOOD PRESSURE: 87 MMHG | HEIGHT: 64 IN | HEART RATE: 78 BPM | TEMPERATURE: 97.5 F

## 2024-10-23 DIAGNOSIS — Z13.29 SCREENING FOR THYROID DISORDER: ICD-10-CM

## 2024-10-23 DIAGNOSIS — E87.8 ELECTROLYTE ABNORMALITY: ICD-10-CM

## 2024-10-23 DIAGNOSIS — R73.01 ELEVATED FASTING GLUCOSE: ICD-10-CM

## 2024-10-23 DIAGNOSIS — Z13.0 SCREENING FOR DEFICIENCY ANEMIA: ICD-10-CM

## 2024-10-23 DIAGNOSIS — D12.6 TUBULAR ADENOMA OF COLON: ICD-10-CM

## 2024-10-23 DIAGNOSIS — Z12.31 ENCOUNTER FOR SCREENING MAMMOGRAM FOR MALIGNANT NEOPLASM OF BREAST: ICD-10-CM

## 2024-10-23 DIAGNOSIS — Z13.1 SCREENING FOR DIABETES MELLITUS (DM): ICD-10-CM

## 2024-10-23 DIAGNOSIS — Z13.220 SCREENING FOR HYPERLIPIDEMIA: ICD-10-CM

## 2024-10-23 DIAGNOSIS — I10 BENIGN ESSENTIAL HYPERTENSION: ICD-10-CM

## 2024-10-23 DIAGNOSIS — Z12.4 CERVICAL CANCER SCREENING: ICD-10-CM

## 2024-10-23 DIAGNOSIS — Z00.00 ROUTINE GENERAL MEDICAL EXAMINATION AT A HEALTH CARE FACILITY: Primary | ICD-10-CM

## 2024-10-23 DIAGNOSIS — E66.09 CLASS 1 OBESITY DUE TO EXCESS CALORIES WITH SERIOUS COMORBIDITY AND BODY MASS INDEX (BMI) OF 30.0 TO 30.9 IN ADULT: ICD-10-CM

## 2024-10-23 DIAGNOSIS — E66.811 CLASS 1 OBESITY DUE TO EXCESS CALORIES WITH SERIOUS COMORBIDITY AND BODY MASS INDEX (BMI) OF 30.0 TO 30.9 IN ADULT: ICD-10-CM

## 2024-10-23 LAB
ALBUMIN SERPL BCG-MCNC: 4.6 G/DL (ref 3.5–5.2)
ALP SERPL-CCNC: 75 U/L (ref 40–150)
ALT SERPL W P-5'-P-CCNC: 44 U/L (ref 0–50)
ANION GAP SERPL CALCULATED.3IONS-SCNC: 13 MMOL/L (ref 7–15)
AST SERPL W P-5'-P-CCNC: 28 U/L (ref 0–45)
BILIRUB SERPL-MCNC: 0.7 MG/DL
BUN SERPL-MCNC: 10.8 MG/DL (ref 6–20)
CALCIUM SERPL-MCNC: 9.7 MG/DL (ref 8.8–10.4)
CHLORIDE SERPL-SCNC: 94 MMOL/L (ref 98–107)
CHOLEST SERPL-MCNC: 183 MG/DL
CREAT SERPL-MCNC: 0.79 MG/DL (ref 0.51–0.95)
CREAT UR-MCNC: 50.5 MG/DL
EGFRCR SERPLBLD CKD-EPI 2021: 86 ML/MIN/1.73M2
ERYTHROCYTE [DISTWIDTH] IN BLOOD BY AUTOMATED COUNT: 11.9 % (ref 10–15)
EST. AVERAGE GLUCOSE BLD GHB EST-MCNC: 103 MG/DL
FASTING STATUS PATIENT QL REPORTED: YES
FASTING STATUS PATIENT QL REPORTED: YES
GLUCOSE SERPL-MCNC: 105 MG/DL (ref 70–99)
HBA1C MFR BLD: 5.2 % (ref 0–5.6)
HCO3 SERPL-SCNC: 25 MMOL/L (ref 22–29)
HCT VFR BLD AUTO: 39.4 % (ref 35–47)
HDLC SERPL-MCNC: 65 MG/DL
HGB BLD-MCNC: 13.6 G/DL (ref 11.7–15.7)
LDLC SERPL CALC-MCNC: 101 MG/DL
MCH RBC QN AUTO: 30.9 PG (ref 26.5–33)
MCHC RBC AUTO-ENTMCNC: 34.5 G/DL (ref 31.5–36.5)
MCV RBC AUTO: 90 FL (ref 78–100)
MICROALBUMIN UR-MCNC: 18.4 MG/L
MICROALBUMIN/CREAT UR: 36.44 MG/G CR (ref 0–25)
NONHDLC SERPL-MCNC: 118 MG/DL
PLATELET # BLD AUTO: 242 10E3/UL (ref 150–450)
POTASSIUM SERPL-SCNC: 3.9 MMOL/L (ref 3.4–5.3)
PROT SERPL-MCNC: 7.9 G/DL (ref 6.4–8.3)
RBC # BLD AUTO: 4.4 10E6/UL (ref 3.8–5.2)
SODIUM SERPL-SCNC: 132 MMOL/L (ref 135–145)
TRIGL SERPL-MCNC: 85 MG/DL
TSH SERPL DL<=0.005 MIU/L-ACNC: 1.91 UIU/ML (ref 0.3–4.2)
WBC # BLD AUTO: 6.1 10E3/UL (ref 4–11)

## 2024-10-23 PROCEDURE — 82043 UR ALBUMIN QUANTITATIVE: CPT | Performed by: FAMILY MEDICINE

## 2024-10-23 PROCEDURE — 84443 ASSAY THYROID STIM HORMONE: CPT | Performed by: FAMILY MEDICINE

## 2024-10-23 PROCEDURE — 83036 HEMOGLOBIN GLYCOSYLATED A1C: CPT | Performed by: FAMILY MEDICINE

## 2024-10-23 PROCEDURE — 85027 COMPLETE CBC AUTOMATED: CPT | Performed by: FAMILY MEDICINE

## 2024-10-23 PROCEDURE — 99214 OFFICE O/P EST MOD 30 MIN: CPT | Performed by: FAMILY MEDICINE

## 2024-10-23 PROCEDURE — 90480 ADMN SARSCOV2 VAC 1/ONLY CMP: CPT | Performed by: FAMILY MEDICINE

## 2024-10-23 PROCEDURE — 80053 COMPREHEN METABOLIC PANEL: CPT | Performed by: FAMILY MEDICINE

## 2024-10-23 PROCEDURE — 99396 PREV VISIT EST AGE 40-64: CPT | Mod: 25 | Performed by: FAMILY MEDICINE

## 2024-10-23 PROCEDURE — 36415 COLL VENOUS BLD VENIPUNCTURE: CPT | Performed by: FAMILY MEDICINE

## 2024-10-23 PROCEDURE — 82570 ASSAY OF URINE CREATININE: CPT | Performed by: FAMILY MEDICINE

## 2024-10-23 PROCEDURE — 80061 LIPID PANEL: CPT | Performed by: FAMILY MEDICINE

## 2024-10-23 PROCEDURE — 91320 SARSCV2 VAC 30MCG TRS-SUC IM: CPT | Performed by: FAMILY MEDICINE

## 2024-10-23 RX ORDER — LISINOPRIL AND HYDROCHLOROTHIAZIDE 20; 25 MG/1; MG/1
1 TABLET ORAL DAILY
Qty: 90 TABLET | Refills: 3 | Status: SHIPPED | OUTPATIENT
Start: 2024-10-23

## 2024-10-23 SDOH — HEALTH STABILITY: PHYSICAL HEALTH: ON AVERAGE, HOW MANY DAYS PER WEEK DO YOU ENGAGE IN MODERATE TO STRENUOUS EXERCISE (LIKE A BRISK WALK)?: 2 DAYS

## 2024-10-23 SDOH — HEALTH STABILITY: PHYSICAL HEALTH: ON AVERAGE, HOW MANY MINUTES DO YOU ENGAGE IN EXERCISE AT THIS LEVEL?: 20 MIN

## 2024-10-23 ASSESSMENT — PAIN SCALES - GENERAL: PAINLEVEL_OUTOF10: NO PAIN (0)

## 2024-10-23 ASSESSMENT — SOCIAL DETERMINANTS OF HEALTH (SDOH): HOW OFTEN DO YOU GET TOGETHER WITH FRIENDS OR RELATIVES?: ONCE A WEEK

## 2024-10-23 NOTE — PATIENT INSTRUCTIONS
Patient Education   Preventive Care Advice   This is general advice given by our system to help you stay healthy. However, your care team may have specific advice just for you. Please talk to your care team about your preventive care needs.  Nutrition  Eat 5 or more servings of fruits and vegetables each day.  Try wheat bread, brown rice and whole grain pasta (instead of white bread, rice, and pasta).  Get enough calcium and vitamin D. Check the label on foods and aim for 100% of the RDA (recommended daily allowance).  Lifestyle  Exercise at least 150 minutes each week  (30 minutes a day, 5 days a week).  Do muscle strengthening activities 2 days a week. These help control your weight and prevent disease.  No smoking.  Wear sunscreen to prevent skin cancer.  Have a dental exam and cleaning every 6 months.  Yearly exams  See your health care team every year to talk about:  Any changes in your health.  Any medicines your care team has prescribed.  Preventive care, family planning, and ways to prevent chronic diseases.  Shots (vaccines)   HPV shots (up to age 26), if you've never had them before.  Hepatitis B shots (up to age 59), if you've never had them before.  COVID-19 shot: Get this shot when it's due.  Flu shot: Get a flu shot every year.  Tetanus shot: Get a tetanus shot every 10 years.  Pneumococcal, hepatitis A, and RSV shots: Ask your care team if you need these based on your risk.  Shingles shot (for age 50 and up)  General health tests  Diabetes screening:  Starting at age 35, Get screened for diabetes at least every 3 years.  If you are younger than age 35, ask your care team if you should be screened for diabetes.  Cholesterol test: At age 39, start having a cholesterol test every 5 years, or more often if advised.  Bone density scan (DEXA): At age 50, ask your care team if you should have this scan for osteoporosis (brittle bones).  Hepatitis C: Get tested at least once in your life.  STIs (sexually  transmitted infections)  Before age 24: Ask your care team if you should be screened for STIs.  After age 24: Get screened for STIs if you're at risk. You are at risk for STIs (including HIV) if:  You are sexually active with more than one person.  You don't use condoms every time.  You or a partner was diagnosed with a sexually transmitted infection.  If you are at risk for HIV, ask about PrEP medicine to prevent HIV.  Get tested for HIV at least once in your life, whether you are at risk for HIV or not.  Cancer screening tests  Cervical cancer screening: If you have a cervix, begin getting regular cervical cancer screening tests starting at age 21.  Breast cancer scan (mammogram): If you've ever had breasts, begin having regular mammograms starting at age 40. This is a scan to check for breast cancer.  Colon cancer screening: It is important to start screening for colon cancer at age 45.  Have a colonoscopy test every 10 years (or more often if you're at risk) Or, ask your provider about stool tests like a FIT test every year or Cologuard test every 3 years.  To learn more about your testing options, visit:   .  For help making a decision, visit:   https://bit.ly/uu22225.  Prostate cancer screening test: If you have a prostate, ask your care team if a prostate cancer screening test (PSA) at age 55 is right for you.  Lung cancer screening: If you are a current or former smoker ages 50 to 80, ask your care team if ongoing lung cancer screenings are right for you.  For informational purposes only. Not to replace the advice of your health care provider. Copyright   2023 [employer]. All rights reserved. Clinically reviewed by the [facility]. [employer] 583792 - REV 01/24.

## 2024-10-23 NOTE — NURSING NOTE
Prior to immunization administration, verified patients identity using patient s name and date of birth. Please see Immunization Activity for additional information.     Screening Questionnaire for Adult Immunization    Are you sick today?   No   Do you have allergies to medications, food, a vaccine component or latex?   No   Have you ever had a serious reaction after receiving a vaccination?   No   Do you have a long-term health problem with heart, lung, kidney, or metabolic disease (e.g., diabetes), asthma, a blood disorder, no spleen, complement component deficiency, a cochlear implant, or a spinal fluid leak?  Are you on long-term aspirin therapy?   No   Do you have cancer, leukemia, HIV/AIDS, or any other immune system problem?   No   Do you have a parent, brother, or sister with an immune system problem?   No   In the past 3 months, have you taken medications that affect  your immune system, such as prednisone, other steroids, or anticancer drugs; drugs for the treatment of rheumatoid arthritis, Crohn s disease, or psoriasis; or have you had radiation treatments?   No   Have you had a seizure, or a brain or other nervous system problem?   No   During the past year, have you received a transfusion of blood or blood    products, or been given immune (gamma) globulin or antiviral drug?   No   For women: Are you pregnant or is there a chance you could become       pregnant during the next month?   No   Have you received any vaccinations in the past 4 weeks?   No     Immunization questionnaire answers were all negative.      Patient instructed to remain in clinic for 15 minutes afterwards, and to report any adverse reactions.     Screening performed by Tami Robison MA on 10/23/2024 at 9:04 AM.

## 2024-10-23 NOTE — RESULT ENCOUNTER NOTE
Bruno Wade,  Your recent labs showed normal hemoglobin, liver and kidney test and fasting cholesterol numbers within range these are reassuring.  Your sodium and chloride are in the low range, either this could be from excess hydration or from avoiding salt.  I would recommend to recheck the electrolytes while you are not fasting in a couple of weeks to see the trend for further advisement.  Urine showed small amount of protein, this will normalize once you start following the diet changes that we discussed during the visit.  Your fasting blood sugar is slightly elevated but is not concerning for diabetes given the normal A1c which is a lab for 3-month average of blood sugars.   Let me know if you have any questions. Take care.  Ary Saldivar MD

## 2024-10-23 NOTE — PROGRESS NOTES
Preventive Care Visit  [facility]  Ary Saldivar MD, Family Medicine  Oct 23, 2024      Assessment & Plan     Routine general medical examination at a health care facility  Discussed on regular exercises, daily calcium intake, healthy eating, and routine dental checks      Benign essential hypertension  BP Readings from Last 6 Encounters:   10/23/24 127/87   09/27/23 130/87   07/23/23 (!) 152/84   01/25/23 132/82   12/07/21 120/76   12/09/20 112/68     Blood pressure is at goal, continue with current dose of Zestoretic, low-salt diet, regular exercises  Will get the fasting labs for therapeutic monitoring today  Follow-up in 1 year or sooner if needed  - CBC with platelets; Future  - Comprehensive metabolic panel (BMP + Alb, Alk Phos, ALT, AST, Total. Bili, TP); Future  - Albumin Random Urine Quantitative with Creat Ratio; Future  - lisinopril-hydrochlorothiazide (ZESTORETIC) 20-25 MG tablet; Take 1 tablet by mouth daily.    Screening for deficiency anemia    - CBC with platelets; Future    Screening for diabetes mellitus (DM)    - Comprehensive metabolic panel (BMP + Alb, Alk Phos, ALT, AST, Total. Bili, TP); Future    Screening for hyperlipidemia    - Lipid panel reflex to direct LDL Fasting; Future    Tubular adenoma of colon  Reviewed colonoscopy from 2020 showing tubular adenoma of colon with recommendation to repeat in 5 years in 2025    Cervical cancer screening  Patient is not due for Pap until 2028    Encounter for screening mammogram for malignant neoplasm of breast  Patient is scheduled for mammogram next month    Screening for thyroid disorder    - TSH with free T4 reflex; Future    Class 1 obesity due to excess calories with serious comorbidity and body mass index (BMI) of 30.0 to 30.9 in adult  Wt Readings from Last 5 Encounters:   10/23/24 80.3 kg (177 lb 2 oz)   09/27/23 74.9 kg (165 lb 3.2 oz)   07/23/23 75.3 kg (165 lb 14.4 oz)   01/25/23 77.9 kg (171 lb 11.2 oz)  "  12/07/21 79.8 kg (175 lb 14.4 oz)     Emphasized on weight loss, portion control, low calorie and low fat diet, healthy eating, regular exercises.             BMI  Estimated body mass index is 30.09 kg/m  as calculated from the following:    Height as of this encounter: 1.634 m (5' 4.33\").    Weight as of this encounter: 80.3 kg (177 lb 2 oz).   Weight management plan: As above    Counseling  Appropriate preventive services were addressed with this patient via screening, questionnaire, or discussion as appropriate for fall prevention, nutrition, physical activity, Tobacco-use cessation, social engagement, weight loss and cognition.  Checklist reviewing preventive services available has been given to the patient.  Reviewed patient's diet, addressing concerns and/or questions.   She is at risk for lack of exercise and has been provided with information to increase physical activity for the benefit of her well-being.   She is at risk for psychosocial distress and has been provided with information to reduce risk.       Work on weight loss  Regular exercise  Chart documentation done in part with Dragon Voice recognition Software. Although reviewed after completion, some word and grammatical error may remain.    See Patient Instructions    Subjective   Sheri is a 59 year old, presenting for the following:  Annual Visit        10/23/2024     8:24 AM   Additional Questions   Roomed by Diane Lynne Schoenherr RN          HPI        Hypertension Follow-up    Do you check your blood pressure regularly outside of the clinic? No   Are you following a low salt diet? No  Are your blood pressures ever more than 140 on the top number (systolic) OR more   than 90 on the bottom number (diastolic), for example 140/90? N/a  Health Care Directive  Patient has a Health Care Directive on file  Advance care planning document is on file and is current.      10/23/2024   General Health   How would you rate your overall physical health? " Good   Feel stress (tense, anxious, or unable to sleep) Only a little      (!) STRESS CONCERN      10/23/2024   Nutrition   Three or more servings of calcium each day? (!) NO   Diet: Regular (no restrictions)   How many servings of fruit and vegetables per day? (!) 0-1   How many sweetened beverages each day? 0-1            10/23/2024   Exercise   Days per week of moderate/strenous exercise 2 days   Average minutes spent exercising at this level 20 min      (!) EXERCISE CONCERN      10/23/2024   Social Factors   Frequency of gathering with friends or relatives Once a week   Worry food won't last until get money to buy more No   Food not last or not have enough money for food? No   Do you have housing? (Housing is defined as stable permanent housing and does not include staying ouside in a car, in a tent, in an abandoned building, in an overnight shelter, or couch-surfing.) Yes   Are you worried about losing your housing? No   Lack of transportation? No   Unable to get utilities (heat,electricity)? No            10/23/2024   Fall Risk   Fallen 2 or more times in the past year? No    Trouble with walking or balance? No        Patient-reported          10/23/2024   Dental   Dentist two times every year? Yes               Today's PHQ-2 Score:       10/23/2024     8:33 AM   PHQ-2 ( 1999 [employer])   Q1: Little interest or pleasure in doing things 0   Q2: Feeling down, depressed or hopeless 0   PHQ-2 Score 0           10/23/2024   Substance Use   Alcohol more than 3/day or more than 7/wk No   Do you use any other substances recreationally? No        Social History     Tobacco Use    Smoking status: Never    Smokeless tobacco: Never   Vaping Use    Vaping status: Never Used   Substance Use Topics    Alcohol use: Yes     Alcohol/week: 5.8 standard drinks of alcohol     Types: 7 Standard drinks or equivalent per week     Comment: 1-2 drinks per day     Drug use: No           11/6/2023   LAST FHS-7 RESULTS   1st degree relative  breast or ovarian cancer Yes   Any relative bilateral breast cancer No   Any male have breast cancer No   Any ONE woman have BOTH breast AND ovarian cancer No   Any woman with breast cancer before 50yrs No   2 or more relatives with breast AND/OR ovarian cancer No   2 or more relatives with breast AND/OR bowel cancer No           Mammogram Screening - Mammogram every 1-2 years updated in Health Maintenance based on mutual decision making        10/23/2024   STI Screening   New sexual partner(s) since last STI/HIV test? No        History of abnormal Pap smear: No - age 30- 64 PAP with HPV every 5 years recommended        Latest Ref Rng & Units 9/27/2023     7:50 AM 10/19/2020    10:21 AM 10/19/2020    10:16 AM   PAP / HPV   PAP  Negative for Intraepithelial Lesion or Malignancy (NILM)      PAP (Historical)    NIL    HPV 16 DNA Negative Negative  Negative     HPV 18 DNA Negative Negative  Negative     Other HR HPV Negative Negative  Negative       ASCVD Risk   The 10-year ASCVD risk score (Narcisa SARKAR, et al., 2019) is: 2.5%    Values used to calculate the score:      Age: 59 years      Sex: Female      Is Non- : No      Diabetic: No      Tobacco smoker: No      Systolic Blood Pressure: 127 mmHg      Is BP treated: Yes      HDL Cholesterol: 79 mg/dL      Total Cholesterol: 183 mg/dL           Reviewed and updated as needed this visit by Provider                    Past Medical History:   Diagnosis Date    ASCUS with positive high risk HPV 7/23/14    + HR HPV 52    Breast cyst     Chicken pox     H/O colposcopy with cervical biopsy 9/2014    Neg for dysplasia    Infertility     s/p 3 rounds of IVF. Was pregnant only very briefly    Obesity     Positive TB test     Has been on INH Janurary 2012     Past Surgical History:   Procedure Laterality Date    COLONOSCOPY N/A 9/18/2017    Procedure: COMBINED COLONOSCOPY, SINGLE OR MULTIPLE BIOPSY/POLYPECTOMY BY BIOPSY;;  Surgeon: Milan Cameron,  MD;  Location: [facility] OR    COLONOSCOPY WITH CO2 INSUFFLATION N/A 2017    Procedure: COLONOSCOPY WITH CO2 INSUFFLATION;  Screening for colon cancer  Routine general medical examination at a health care facility  bmi 28.19  Pharmacy: [facility] [place]  ;  Surgeon: Milan Cameron MD;  Location: [facility] OR    COLONOSCOPY WITH CO2 INSUFFLATION N/A 2020    Procedure: COLONOSCOPY, WITH CO2 INSUFFLATION;  Surgeon: Milan Cameron MD;  Location: [facility] OR    CYSTECTOMY PILONIDAL      HC TOOTH EXTRACTION W/FORCEP  ?    SURGICAL HISTORY OF -       infertility workup-hysteroscope      OB History    Para Term  AB Living   0 0 0 0 0 0   SAB IAB Ectopic Multiple Live Births   0 0 0 0 0     Lab work is in process  Labs reviewed in EPIC  BP Readings from Last 3 Encounters:   10/23/24 127/87   23 130/87   23 (!) 152/84    Wt Readings from Last 3 Encounters:   10/23/24 80.3 kg (177 lb 2 oz)   23 74.9 kg (165 lb 3.2 oz)   23 75.3 kg (165 lb 14.4 oz)                  Patient Active Problem List   Diagnosis    CARDIOVASCULAR SCREENING; LDL GOAL LESS THAN 160    Family history of breast cancer    Overweight (BMI 25.0-29.9)    ASCUS with positive high risk HPV cervical    Benign essential hypertension    Tubular adenoma of colon    Elevated ALT measurement     Past Surgical History:   Procedure Laterality Date    COLONOSCOPY N/A 2017    Procedure: COMBINED COLONOSCOPY, SINGLE OR MULTIPLE BIOPSY/POLYPECTOMY BY BIOPSY;;  Surgeon: Milan Cameron MD;  Location: [facility] OR    COLONOSCOPY WITH CO2 INSUFFLATION N/A 2017    Procedure: COLONOSCOPY WITH CO2 INSUFFLATION;  Screening for colon cancer  Routine general medical examination at a health care facility  bmi 28.19  Pharmacy: [facility] [place]  ;  Surgeon: Milan Cameron MD;  Location: [facility] OR    COLONOSCOPY WITH CO2 INSUFFLATION N/A 2020    Procedure: COLONOSCOPY, WITH CO2 INSUFFLATION;  Surgeon: Milan Cameron MD;  Location: [facility]  OR    CYSTECTOMY PILONIDAL  1996     TOOTH EXTRACTION W/FORCEP  2002?    SURGICAL HISTORY OF -       infertility workup-hysteroscope        Social History     Tobacco Use    Smoking status: Never    Smokeless tobacco: Never   Substance Use Topics    Alcohol use: Yes     Alcohol/week: 5.8 standard drinks of alcohol     Types: 7 Standard drinks or equivalent per week     Comment: 1-2 drinks per day      Family History   Problem Relation Age of Onset    Hypertension Father     Lipids Father     Diabetes Maternal Grandfather     Cerebrovascular Disease Maternal Grandmother     Breast Cancer Mother     Cancer Mother 73        tumors     C.A.D. Paternal Uncle     Asthma No family hx of     Cancer - colorectal No family hx of     Prostate Cancer No family hx of          Current Outpatient Medications   Medication Sig Dispense Refill    Blood Pressure Monitor KIT Use to check BP daily 1 kit 0    lisinopril-hydrochlorothiazide (ZESTORETIC) 20-25 MG tablet Take 1 tablet by mouth daily. 90 tablet 3    Multiple Vitamins-Minerals (DAILY MULTIVITAMIN PO) Take 1 tablet by mouth daily       No Known Allergies  Recent Labs   Lab Test 09/27/23  0817 01/25/23  0817 12/07/21  0909 10/19/20  0951 12/11/19  1044 11/21/18  1319 06/28/17  1023   A1C 5.1  --   --   --   --   --   --    LDL  --  89 105* 114* 80   < > 70   HDL  --  79 58 68 61   < > 76   TRIG  --  76 113 104 75   < > 74   ALT  --  45  --  21 52*   < > 27   CR  --  0.72 0.72 0.70 0.82   < > 0.79   GFRESTIMATED  --  >90 >90 >90 82   < > 77   GFRESTBLACK  --   --   --  >90 >90   < > >90  African American GFR Calc     POTASSIUM  --  3.7 3.5 4.1 3.5   < > 3.7   TSH  --   --   --   --  1.21  --  1.80    < > = values in this interval not displayed.          Review of Systems  CONSTITUTIONAL: NEGATIVE for fever, chills, change in weight  INTEGUMENTARY/SKIN: NEGATIVE for worrisome rashes, moles or lesions  EYES: NEGATIVE for vision changes or irritation  ENT/MOUTH: NEGATIVE for ear,  "mouth and throat problems  RESP: NEGATIVE for significant cough or SOB  BREAST: NEGATIVE for masses, tenderness or discharge  CV: Hx HTN  GI: NEGATIVE for nausea, abdominal pain, heartburn, or change in bowel habits  : NEGATIVE for frequency, dysuria, or hematuria  MUSCULOSKELETAL: NEGATIVE for significant arthralgias or myalgia  NEURO: NEGATIVE for weakness, dizziness or paresthesias  ENDOCRINE: NEGATIVE for temperature intolerance, skin/hair changes  HEME: NEGATIVE for bleeding problems  PSYCHIATRIC: NEGATIVE for changes in mood or affect     Objective    Exam  /87 (BP Location: Right arm, Patient Position: Sitting, Cuff Size: Adult Large)   Pulse 78   Temp 97.5  F (36.4  C) (Temporal)   Resp 16   Ht 1.634 m (5' 4.33\")   Wt 80.3 kg (177 lb 2 oz)   LMP 11/14/2019 (Approximate)   BMI 30.09 kg/m     Estimated body mass index is 30.09 kg/m  as calculated from the following:    Height as of this encounter: 1.634 m (5' 4.33\").    Weight as of this encounter: 80.3 kg (177 lb 2 oz).    Physical Exam  GENERAL: alert and no distress  EYES: Eyes grossly normal to inspection, PERRL and conjunctivae and sclerae normal  HENT: ear canals and TM's normal, nose and mouth without ulcers or lesions  NECK: no adenopathy, no asymmetry, masses, or scars  RESP: lungs clear to auscultation - no rales, rhonchi or wheezes  BREAST: normal without masses, tenderness or nipple discharge and no palpable axillary masses or adenopathy  CV: regular rate and rhythm, normal S1 S2, no S3 or S4, no murmur, click or rub, no peripheral edema  ABDOMEN: soft, nontender, no hepatosplenomegaly, no masses and bowel sounds normal  MS: no gross musculoskeletal defects noted, no edema  SKIN: no suspicious lesions or rashes  NEURO: Normal strength and tone, mentation intact and speech normal  PSYCH: mentation appears normal, affect normal/bright        Signed Electronically by: Ary Saldivar MD    "

## 2024-11-04 ENCOUNTER — PATIENT OUTREACH (OUTPATIENT)
Dept: CARE COORDINATION | Facility: CLINIC | Age: 59
End: 2024-11-04
Payer: COMMERCIAL

## 2024-11-12 ENCOUNTER — ANCILLARY PROCEDURE (OUTPATIENT)
Dept: MAMMOGRAPHY | Facility: CLINIC | Age: 59
End: 2024-11-12
Attending: FAMILY MEDICINE
Payer: COMMERCIAL

## 2024-11-12 DIAGNOSIS — Z12.31 VISIT FOR SCREENING MAMMOGRAM: ICD-10-CM

## 2024-11-12 PROCEDURE — 77063 BREAST TOMOSYNTHESIS BI: CPT | Mod: GC

## 2024-11-12 PROCEDURE — 77067 SCR MAMMO BI INCL CAD: CPT | Mod: GC

## (undated) DEVICE — SOL WATER IRRIG 1000ML BOTTLE 07139-09

## (undated) RX ORDER — SIMETHICONE 40MG/0.6ML
SUSPENSION, DROPS(FINAL DOSAGE FORM)(ML) ORAL
Status: DISPENSED
Start: 2020-12-09

## (undated) RX ORDER — FENTANYL CITRATE 50 UG/ML
INJECTION, SOLUTION INTRAMUSCULAR; INTRAVENOUS
Status: DISPENSED
Start: 2020-12-09